# Patient Record
Sex: FEMALE | Race: WHITE | Employment: UNEMPLOYED | URBAN - METROPOLITAN AREA
[De-identification: names, ages, dates, MRNs, and addresses within clinical notes are randomized per-mention and may not be internally consistent; named-entity substitution may affect disease eponyms.]

---

## 2017-05-13 ENCOUNTER — HOSPITAL ENCOUNTER (EMERGENCY)
Facility: HOSPITAL | Age: 52
Discharge: HOME | End: 2017-05-13
Attending: EMERGENCY MEDICINE

## 2017-05-13 ENCOUNTER — APPOINTMENT (OUTPATIENT)
Dept: CT IMAGING | Facility: HOSPITAL | Age: 52
End: 2017-05-13
Attending: EMERGENCY MEDICINE

## 2017-05-13 ENCOUNTER — HOSPITAL ENCOUNTER (OUTPATIENT)
Age: 52
Discharge: ANOTHER HOSPITAL | End: 2017-05-13
Attending: FAMILY MEDICINE

## 2017-05-13 VITALS
HEIGHT: 67 IN | OXYGEN SATURATION: 100 % | TEMPERATURE: 98 F | BODY MASS INDEX: 23.07 KG/M2 | SYSTOLIC BLOOD PRESSURE: 116 MMHG | HEART RATE: 72 BPM | DIASTOLIC BLOOD PRESSURE: 84 MMHG | RESPIRATION RATE: 16 BRPM | WEIGHT: 147 LBS

## 2017-05-13 VITALS
WEIGHT: 145 LBS | HEIGHT: 67 IN | DIASTOLIC BLOOD PRESSURE: 72 MMHG | SYSTOLIC BLOOD PRESSURE: 102 MMHG | TEMPERATURE: 98 F | HEART RATE: 62 BPM | RESPIRATION RATE: 16 BRPM | OXYGEN SATURATION: 99 % | BODY MASS INDEX: 22.76 KG/M2

## 2017-05-13 DIAGNOSIS — R13.10 DYSPHAGIA, UNSPECIFIED TYPE: ICD-10-CM

## 2017-05-13 DIAGNOSIS — M54.2 NECK PAIN: Primary | ICD-10-CM

## 2017-05-13 DIAGNOSIS — J02.9 ACUTE PHARYNGITIS, UNSPECIFIED ETIOLOGY: ICD-10-CM

## 2017-05-13 DIAGNOSIS — R25.2 TRISMUS: Primary | ICD-10-CM

## 2017-05-13 DIAGNOSIS — I88.9 LYMPHADENITIS: ICD-10-CM

## 2017-05-13 PROCEDURE — 99285 EMERGENCY DEPT VISIT HI MDM: CPT

## 2017-05-13 PROCEDURE — 99205 OFFICE O/P NEW HI 60 MIN: CPT

## 2017-05-13 PROCEDURE — 80053 COMPREHEN METABOLIC PANEL: CPT | Performed by: EMERGENCY MEDICINE

## 2017-05-13 PROCEDURE — 70491 CT SOFT TISSUE NECK W/DYE: CPT | Performed by: EMERGENCY MEDICINE

## 2017-05-13 PROCEDURE — 96376 TX/PRO/DX INJ SAME DRUG ADON: CPT

## 2017-05-13 PROCEDURE — 96374 THER/PROPH/DIAG INJ IV PUSH: CPT

## 2017-05-13 PROCEDURE — 96375 TX/PRO/DX INJ NEW DRUG ADDON: CPT

## 2017-05-13 PROCEDURE — 85025 COMPLETE CBC W/AUTO DIFF WBC: CPT | Performed by: EMERGENCY MEDICINE

## 2017-05-13 RX ORDER — TRAMADOL HYDROCHLORIDE 50 MG/1
TABLET ORAL EVERY 4 HOURS PRN
Qty: 20 TABLET | Refills: 0 | Status: SHIPPED | OUTPATIENT
Start: 2017-05-13 | End: 2017-05-20

## 2017-05-13 RX ORDER — AMOXICILLIN AND CLAVULANATE POTASSIUM 875; 125 MG/1; MG/1
1 TABLET, FILM COATED ORAL 2 TIMES DAILY
Qty: 20 TABLET | Refills: 0 | Status: SHIPPED | OUTPATIENT
Start: 2017-05-13 | End: 2017-05-23

## 2017-05-13 RX ORDER — HYDROMORPHONE HYDROCHLORIDE 1 MG/ML
0.5 INJECTION, SOLUTION INTRAMUSCULAR; INTRAVENOUS; SUBCUTANEOUS ONCE
Status: COMPLETED | OUTPATIENT
Start: 2017-05-13 | End: 2017-05-13

## 2017-05-13 RX ORDER — ONDANSETRON 2 MG/ML
4 INJECTION INTRAMUSCULAR; INTRAVENOUS ONCE
Status: COMPLETED | OUTPATIENT
Start: 2017-05-13 | End: 2017-05-13

## 2017-05-13 RX ORDER — ONDANSETRON 4 MG/1
4 TABLET, ORALLY DISINTEGRATING ORAL EVERY 4 HOURS PRN
Qty: 10 TABLET | Refills: 0 | Status: SHIPPED | OUTPATIENT
Start: 2017-05-13 | End: 2017-05-20

## 2017-05-13 NOTE — ED PROVIDER NOTES
She is a 55-year-old female presenting to the emergency department with painful swollen area inferior to the right ear. Patient was initially evaluated by my partner, please refer to his documentation for additional details.     Patient was signed over t

## 2017-05-13 NOTE — ED PROVIDER NOTES
Patient Seen in: [facility] Emergency Department    History   Patient presents with:  Sore Throat  Swallowing Problem (gastrointestinal)    Stated Complaint: sore throat    HPI    Patient is a 15-year-old woman presents from immediate care for evalu Current:/79 mmHg  Pulse 66  Temp(Src) 98.2 °F (36.8 °C) (Temporal)  Resp 16  Ht 170.2 cm (5' 7\")  Wt 65.772 kg  BMI 22.71 kg/m2  SpO2 100%        Physical Exam    General: Patient is resting comfortably in no acute distress  HEENT: Normal ceph pathology, symptomatic care and follow-up with your ENT  in the next 2-3 days. Return if unable swallow, any shortness of breath, any fevers.         Disposition and Plan     Clinical Impression:  Neck pain  (primary encounter diagnosis)  Lymphadenitis

## 2017-05-13 NOTE — ED NOTES
>Received patient ambulatory to room, alert x oriented x 3 breathing easy, not in any distress with gait steady accompanied by spouse. Pt woke up with right side neck bump this morning. 10/10 when swallowing saliva.  Pt does not open mouth fully when talki

## 2017-05-13 NOTE — ED PROVIDER NOTES
Patient Seen in: THE [facility] [place] [facility]    History   Patient presents with:  Swelling  Sore Throat    Stated Complaint: swollen/sore throat    HPI    Erick Wilson is a 25-year-old female presents for evaluation of severe sore throat and difficul 66.679 kg  BMI 23.02 kg/m2  SpO2 100%        Physical Exam   Constitutional: She is oriented to person, place, and time. She appears well-developed and well-nourished. She appears distressed.    Patient actively cringes every time she swallows her own saliv the HPI, and have examined the patient and agree with the physical exam findings and the assessment and plan as above. Patient is unable to open her mouth to be able to hydrate and nourish herself.  She also has a firm-hard swelling that is warm and tender

## 2017-05-13 NOTE — ED INITIAL ASSESSMENT (HPI)
Right side neck- swelling noted today when pt woke up  Sore throat- x 2 days. Denies fever. Pain on swallowing, denies cough or cold symptoms. Pt states pain 10/10 when she swallows her saliva, pt does not fully open mouth when talking.

## 2017-05-13 NOTE — ED NOTES
Pt is up for discharge, but doesn't have a ride for another hour,  Pt is groggy, speech is slurred. Will wait until  is here before discharge.

## 2017-11-21 ENCOUNTER — IMAGING SERVICES (OUTPATIENT)
Dept: OTHER | Age: 52
End: 2017-11-21

## 2017-11-21 ENCOUNTER — HOSPITAL (OUTPATIENT)
Dept: OTHER | Age: 52
End: 2017-11-21
Attending: FAMILY MEDICINE

## 2019-01-14 ENCOUNTER — HOSPITAL (OUTPATIENT)
Dept: OTHER | Age: 54
End: 2019-01-14

## 2020-01-20 ENCOUNTER — HOSPITAL (OUTPATIENT)
Dept: OTHER | Age: 55
End: 2020-01-20

## 2020-09-21 ENCOUNTER — TELEPHONE (OUTPATIENT)
Dept: INTERNAL MEDICINE CLINIC | Facility: CLINIC | Age: 55
End: 2020-09-21

## 2020-09-21 NOTE — TELEPHONE ENCOUNTER
Patient requesting new patient to establish with TO; ok per staff message sent to Epic/replied to request as  is current patient

## 2020-09-28 ENCOUNTER — OFFICE VISIT (OUTPATIENT)
Dept: INTERNAL MEDICINE CLINIC | Facility: CLINIC | Age: 55
End: 2020-09-28
Payer: COMMERCIAL

## 2020-09-28 VITALS
WEIGHT: 153 LBS | RESPIRATION RATE: 16 BRPM | DIASTOLIC BLOOD PRESSURE: 70 MMHG | SYSTOLIC BLOOD PRESSURE: 110 MMHG | BODY MASS INDEX: 24.01 KG/M2 | HEART RATE: 72 BPM | OXYGEN SATURATION: 98 % | HEIGHT: 67 IN

## 2020-09-28 DIAGNOSIS — Z12.31 VISIT FOR SCREENING MAMMOGRAM: ICD-10-CM

## 2020-09-28 DIAGNOSIS — R22.1 MASS OF PARAPHARYNGEAL SPACE: ICD-10-CM

## 2020-09-28 DIAGNOSIS — E89.0 POSTOPERATIVE HYPOTHYROIDISM: Primary | ICD-10-CM

## 2020-09-28 DIAGNOSIS — Z00.00 LABORATORY EXAM ORDERED AS PART OF ROUTINE GENERAL MEDICAL EXAMINATION: ICD-10-CM

## 2020-09-28 DIAGNOSIS — L98.9 SKIN LESION: ICD-10-CM

## 2020-09-28 PROBLEM — H90.5 CONGENITAL HEARING LOSS: Status: ACTIVE | Noted: 2020-09-28

## 2020-09-28 PROCEDURE — 3078F DIAST BP <80 MM HG: CPT | Performed by: FAMILY MEDICINE

## 2020-09-28 PROCEDURE — 3074F SYST BP LT 130 MM HG: CPT | Performed by: FAMILY MEDICINE

## 2020-09-28 PROCEDURE — 99204 OFFICE O/P NEW MOD 45 MIN: CPT | Performed by: FAMILY MEDICINE

## 2020-09-28 PROCEDURE — 3008F BODY MASS INDEX DOCD: CPT | Performed by: FAMILY MEDICINE

## 2020-09-28 RX ORDER — LEVOTHYROXINE SODIUM 0.03 MG/1
TABLET ORAL
Refills: 0 | COMMUNITY
Start: 2020-09-28 | End: 2020-10-15

## 2020-09-28 RX ORDER — THYROID, PORCINE 60 MG/1
TABLET ORAL
COMMUNITY
Start: 2019-03-13 | End: 2020-10-15

## 2020-09-28 RX ORDER — LIOTHYRONINE SODIUM 25 UG/1
TABLET ORAL
Refills: 0 | COMMUNITY
Start: 2020-09-28 | End: 2020-10-15

## 2020-09-28 NOTE — PROGRESS NOTES
Kailyn Jaffe is a 48year old female.   HPI:   Here to get established   Recently     Daniel Samaniego sees us  Had Csope 3 yr ago   To get me records  May have had a piolyp        Also had EGD at the time     Was on PPI   No longer though kg/m²   GENERAL: well developed, well nourished,in no apparent distress  SKIN: no rashes  NECK: supple,no adenopathy   Healed scar    LUNGS: clear to auscultation  CARDIO: RRR without murmur  ABD:  Soft   NT  No mass or HSM    EXTREMITIES: no edema    ASSE

## 2020-10-08 ENCOUNTER — LAB ENCOUNTER (OUTPATIENT)
Dept: LAB | Age: 55
End: 2020-10-08
Attending: FAMILY MEDICINE
Payer: COMMERCIAL

## 2020-10-08 DIAGNOSIS — Z00.00 LABORATORY EXAM ORDERED AS PART OF ROUTINE GENERAL MEDICAL EXAMINATION: ICD-10-CM

## 2020-10-08 DIAGNOSIS — E89.0 POSTOPERATIVE HYPOTHYROIDISM: ICD-10-CM

## 2020-10-08 PROCEDURE — 80053 COMPREHEN METABOLIC PANEL: CPT

## 2020-10-08 PROCEDURE — 85025 COMPLETE CBC W/AUTO DIFF WBC: CPT

## 2020-10-08 PROCEDURE — 84439 ASSAY OF FREE THYROXINE: CPT

## 2020-10-08 PROCEDURE — 36415 COLL VENOUS BLD VENIPUNCTURE: CPT

## 2020-10-08 PROCEDURE — 80061 LIPID PANEL: CPT

## 2020-10-08 PROCEDURE — 84443 ASSAY THYROID STIM HORMONE: CPT

## 2020-10-12 PROBLEM — Z13.71 BRCA NEGATIVE: Status: ACTIVE | Noted: 2020-10-12

## 2020-10-12 PROBLEM — N95.2 ATROPHIC VAGINITIS: Status: ACTIVE | Noted: 2020-10-12

## 2020-10-12 PROBLEM — Z85.850 HX OF THYROID CANCER: Status: ACTIVE | Noted: 2020-10-12

## 2020-10-12 PROBLEM — Z86.010 HX OF COLONIC POLYPS: Status: ACTIVE | Noted: 2020-10-12

## 2020-10-12 PROCEDURE — 87624 HPV HI-RISK TYP POOLED RSLT: CPT | Performed by: OBSTETRICS & GYNECOLOGY

## 2020-10-12 PROCEDURE — 88175 CYTOPATH C/V AUTO FLUID REDO: CPT | Performed by: OBSTETRICS & GYNECOLOGY

## 2020-10-14 ENCOUNTER — PATIENT MESSAGE (OUTPATIENT)
Dept: INTERNAL MEDICINE CLINIC | Facility: CLINIC | Age: 55
End: 2020-10-14

## 2020-10-15 NOTE — TELEPHONE ENCOUNTER
From: Maria Del Carmen Light  To: Kei Gerard MD  Sent: 10/14/2020 2:37 PM CDT  Subject: Prescription Question    Hi Dr. Gricelda Reyna! S/P thyroid panel wnl (my TSH always kept hyperthyroid d/t hx thyroid carcinoma), I need refills on all three thyroid rx's pls.  H

## 2020-10-17 RX ORDER — THYROID, PORCINE 60 MG/1
TABLET ORAL
Qty: 90 TABLET | Refills: 0 | Status: SHIPPED | OUTPATIENT
Start: 2020-10-17 | End: 2020-11-15

## 2020-10-17 RX ORDER — LIOTHYRONINE SODIUM 25 UG/1
25 TABLET ORAL DAILY
Qty: 90 TABLET | Refills: 0 | Status: SHIPPED | OUTPATIENT
Start: 2020-10-17 | End: 2020-10-21

## 2020-10-17 RX ORDER — LEVOTHYROXINE SODIUM 0.03 MG/1
25 TABLET ORAL
Qty: 90 TABLET | Refills: 0 | Status: SHIPPED | OUTPATIENT
Start: 2020-10-17 | End: 2021-02-02

## 2020-10-17 NOTE — TELEPHONE ENCOUNTER
Failed protocol - TSH value between 0.350 and 5.500 IU/ml    Requesting:  Levothyroxine Sodium 25 MCG Oral Tab  Liothyronine Sodium 25 MCG Oral Tab  Thyroid 65 MG Oral Tab    LOV: 9/28/20  RTC: NA  Last Relevant Labs: 10/8/20  Filled: Historical entry on 9

## 2020-10-20 ENCOUNTER — PATIENT MESSAGE (OUTPATIENT)
Dept: INTERNAL MEDICINE CLINIC | Facility: CLINIC | Age: 55
End: 2020-10-20

## 2020-10-21 NOTE — TELEPHONE ENCOUNTER
From: Maria Del Carmen Light  To: Kei Gerard MD  Sent: 10/20/2020 5:38 PM CDT  Subject: Visit Fultonjane Reyna! Attached are the reports of my prior diagnostics.

## 2020-10-21 NOTE — TELEPHONE ENCOUNTER
From: Franco Light  To:  Karma Gore MD  Sent: 10/20/2020 5:44 PM CDT  Subject: Visit Follow-up Question    Diagnostic Reports

## 2020-10-21 NOTE — TELEPHONE ENCOUNTER
From: Thor Allen  To:  Cheikh Ling MD  Sent: 10/20/2020 5:40 PM CDT  Subject: Visit Follow-up Question    Diagnostic Reports

## 2020-10-21 NOTE — TELEPHONE ENCOUNTER
From: Ferny Light  To:  Brenna Quiroz MD  Sent: 10/20/2020 5:41 PM CDT  Subject: Visit Follow-up Question    Diagnostic Repoirts

## 2020-10-30 ENCOUNTER — HOSPITAL ENCOUNTER (OUTPATIENT)
Dept: MAMMOGRAPHY | Age: 55
Discharge: HOME | End: 2020-10-30
Attending: FAMILY MEDICINE
Payer: COMMERCIAL

## 2020-10-30 DIAGNOSIS — Z12.31 VISIT FOR SCREENING MAMMOGRAM: ICD-10-CM

## 2020-10-30 PROCEDURE — 77063 BREAST TOMOSYNTHESIS BI: CPT | Performed by: FAMILY MEDICINE

## 2020-10-30 PROCEDURE — 77067 SCR MAMMO BI INCL CAD: CPT | Performed by: FAMILY MEDICINE

## 2020-11-04 ENCOUNTER — HOSPITAL ENCOUNTER (OUTPATIENT)
Dept: MAMMOGRAPHY | Facility: HOSPITAL | Age: 55
Discharge: HOME | End: 2020-11-04
Attending: FAMILY MEDICINE
Payer: COMMERCIAL

## 2020-11-04 DIAGNOSIS — R92.2 INCONCLUSIVE MAMMOGRAM: ICD-10-CM

## 2020-11-04 DIAGNOSIS — R92.8 ABNORMAL MAMMOGRAM OF LEFT BREAST: Primary | ICD-10-CM

## 2020-11-04 PROCEDURE — 76642 ULTRASOUND BREAST LIMITED: CPT | Performed by: FAMILY MEDICINE

## 2020-11-04 PROCEDURE — 77061 BREAST TOMOSYNTHESIS UNI: CPT | Performed by: FAMILY MEDICINE

## 2020-11-04 PROCEDURE — 77065 DX MAMMO INCL CAD UNI: CPT | Performed by: FAMILY MEDICINE

## 2020-11-04 NOTE — IMAGING NOTE
Assisted Dr Gerson Carpio with Left breast biopsy recommendation, BIRADS 4C. Explained procedure and written instructions given. Pt screened and denies blood thinners, bleeding issues, chemo or liver disease.  Reviewed to eat, take 1000 mg of acetaminophen, and br

## 2020-11-06 ENCOUNTER — HOSPITAL ENCOUNTER (OUTPATIENT)
Dept: MAMMOGRAPHY | Facility: HOSPITAL | Age: 55
Discharge: HOME | End: 2020-11-06
Attending: FAMILY MEDICINE
Payer: COMMERCIAL

## 2020-11-06 ENCOUNTER — TELEPHONE (OUTPATIENT)
Dept: INTERNAL MEDICINE CLINIC | Facility: CLINIC | Age: 55
End: 2020-11-06

## 2020-11-06 DIAGNOSIS — R92.8 ABNORMAL MAMMOGRAM OF LEFT BREAST: ICD-10-CM

## 2020-11-06 PROCEDURE — 88377 M/PHMTRC ALYS ISHQUANT/SEMIQ: CPT | Performed by: FAMILY MEDICINE

## 2020-11-06 PROCEDURE — 19083 BX BREAST 1ST LESION US IMAG: CPT | Performed by: FAMILY MEDICINE

## 2020-11-06 PROCEDURE — 88342 IMHCHEM/IMCYTCHM 1ST ANTB: CPT | Performed by: FAMILY MEDICINE

## 2020-11-06 PROCEDURE — 77065 DX MAMMO INCL CAD UNI: CPT | Performed by: FAMILY MEDICINE

## 2020-11-06 PROCEDURE — 88360 TUMOR IMMUNOHISTOCHEM/MANUAL: CPT | Performed by: FAMILY MEDICINE

## 2020-11-06 PROCEDURE — 88305 TISSUE EXAM BY PATHOLOGIST: CPT | Performed by: FAMILY MEDICINE

## 2020-11-10 ENCOUNTER — NURSE NAVIGATOR ENCOUNTER (OUTPATIENT)
Dept: HEMATOLOGY/ONCOLOGY | Facility: HOSPITAL | Age: 55
End: 2020-11-10

## 2020-11-10 ENCOUNTER — TELEPHONE (OUTPATIENT)
Dept: INTERNAL MEDICINE CLINIC | Facility: CLINIC | Age: 55
End: 2020-11-10

## 2020-11-10 ENCOUNTER — TELEPHONE (OUTPATIENT)
Dept: MAMMOGRAPHY | Facility: HOSPITAL | Age: 55
End: 2020-11-10

## 2020-11-10 DIAGNOSIS — C50.912 MALIGNANT NEOPLASM OF LEFT FEMALE BREAST (HCC): Primary | ICD-10-CM

## 2020-11-10 NOTE — TELEPHONE ENCOUNTER
Spoke with Devang Freeman, nurse from Dr. Alves Blind office and results of breast biopsy given. Pt has IDC. Dr. Kenny Meier or Dr. Michelle Bound to be consulted.

## 2020-11-10 NOTE — TELEPHONE ENCOUNTER
Received call from Desiree Norton at [facility] department, stating she will call patient with following results and get her set up with their Navigator to schedule with Dr. Kris Fishman or Dr. Uche Allen. Patient will call our office if any further questions. CRISI to TO.        Keisha Sullivan

## 2020-11-10 NOTE — PROGRESS NOTES
Patient phoned  and discussed newly diagnosed breast cancer. Introduced myself and explained my role as the breast navigator.  Explained the role of the physicians on her breast cancer care team. Reviewed over pathology report and discussed the type of patrick

## 2020-11-10 NOTE — IMAGING NOTE
Pt reports no unexpected issues at biopsy site. Pt provided with results of pathology report of diagnosis of IDC. Surgical consult Dr. Yosi Villavicencio or Dr. Shelagh Bernheim recommended by PCP office.  Pt aware that Cesario Linares breast care navigator will be contacting her and c

## 2020-11-11 DIAGNOSIS — C50.912 MALIGNANT NEOPLASM OF LEFT FEMALE BREAST, UNSPECIFIED ESTROGEN RECEPTOR STATUS, UNSPECIFIED SITE OF BREAST (HCC): Primary | ICD-10-CM

## 2020-11-12 ENCOUNTER — HOSPITAL ENCOUNTER (OUTPATIENT)
Dept: MRI IMAGING | Facility: HOSPITAL | Age: 55
Discharge: HOME | End: 2020-11-12
Attending: SURGERY
Payer: COMMERCIAL

## 2020-11-12 DIAGNOSIS — C50.912 MALIGNANT NEOPLASM OF LEFT FEMALE BREAST (HCC): ICD-10-CM

## 2020-11-12 PROCEDURE — A9575 INJ GADOTERATE MEGLUMI 0.1ML: HCPCS | Performed by: SURGERY

## 2020-11-12 PROCEDURE — 77049 MRI BREAST C-+ W/CAD BI: CPT | Performed by: SURGERY

## 2020-11-14 ENCOUNTER — PATIENT MESSAGE (OUTPATIENT)
Dept: INTERNAL MEDICINE CLINIC | Facility: CLINIC | Age: 55
End: 2020-11-14

## 2020-11-15 NOTE — TELEPHONE ENCOUNTER
From: Lupis Light  To: Cherylyn Sandhoff, MD  Sent: 11/14/2020 11:43 AM CST  Subject: Prescription Question    Hi Dr. Amara Reynoso! Can I please have a refill on my NP Thyroid 1 grain (60mg)? There was a prior dosing error so it is not on my rx listing.  I

## 2020-11-16 RX ORDER — LEVOTHYROXINE AND LIOTHYRONINE 38; 9 UG/1; UG/1
60 TABLET ORAL DAILY
Qty: 90 TABLET | Refills: 0 | Status: SHIPPED | OUTPATIENT
Start: 2020-11-16 | End: 2021-02-02

## 2020-11-17 ENCOUNTER — OFFICE VISIT (OUTPATIENT)
Dept: SURGERY | Facility: CLINIC | Age: 55
End: 2020-11-17

## 2020-11-17 ENCOUNTER — GENETICS ENCOUNTER (OUTPATIENT)
Dept: GENETICS | Facility: HOSPITAL | Age: 55
End: 2020-11-17
Attending: INTERNAL MEDICINE
Payer: COMMERCIAL

## 2020-11-17 ENCOUNTER — NURSE ONLY (OUTPATIENT)
Dept: HEMATOLOGY/ONCOLOGY | Facility: HOSPITAL | Age: 55
End: 2020-11-17
Attending: INTERNAL MEDICINE
Payer: COMMERCIAL

## 2020-11-17 ENCOUNTER — HOSPITAL ENCOUNTER (OUTPATIENT)
Dept: GENERAL RADIOLOGY | Facility: HOSPITAL | Age: 55
Discharge: HOME | End: 2020-11-17
Attending: INTERNAL MEDICINE
Payer: COMMERCIAL

## 2020-11-17 ENCOUNTER — NURSE NAVIGATOR ENCOUNTER (OUTPATIENT)
Dept: HEMATOLOGY/ONCOLOGY | Facility: HOSPITAL | Age: 55
End: 2020-11-17

## 2020-11-17 VITALS
OXYGEN SATURATION: 99 % | HEIGHT: 67.09 IN | TEMPERATURE: 98 F | DIASTOLIC BLOOD PRESSURE: 73 MMHG | RESPIRATION RATE: 18 BRPM | SYSTOLIC BLOOD PRESSURE: 136 MMHG | HEART RATE: 69 BPM | BODY MASS INDEX: 23.7 KG/M2 | WEIGHT: 151 LBS

## 2020-11-17 VITALS
HEART RATE: 69 BPM | HEIGHT: 67.09 IN | TEMPERATURE: 98 F | RESPIRATION RATE: 18 BRPM | OXYGEN SATURATION: 99 % | DIASTOLIC BLOOD PRESSURE: 73 MMHG | WEIGHT: 151 LBS | BODY MASS INDEX: 23.7 KG/M2 | SYSTOLIC BLOOD PRESSURE: 136 MMHG

## 2020-11-17 DIAGNOSIS — Z17.0 MALIGNANT NEOPLASM OF UPPER-OUTER QUADRANT OF LEFT BREAST IN FEMALE, ESTROGEN RECEPTOR POSITIVE (HCC): Primary | ICD-10-CM

## 2020-11-17 DIAGNOSIS — C50.412 MALIGNANT NEOPLASM OF UPPER-OUTER QUADRANT OF LEFT BREAST IN FEMALE, ESTROGEN RECEPTOR POSITIVE (HCC): Primary | ICD-10-CM

## 2020-11-17 DIAGNOSIS — C50.412 MALIGNANT NEOPLASM OF UPPER-OUTER QUADRANT OF LEFT BREAST IN FEMALE, ESTROGEN RECEPTOR POSITIVE (HCC): ICD-10-CM

## 2020-11-17 DIAGNOSIS — Z17.0 MALIGNANT NEOPLASM OF UPPER-OUTER QUADRANT OF LEFT BREAST IN FEMALE, ESTROGEN RECEPTOR POSITIVE (HCC): ICD-10-CM

## 2020-11-17 PROCEDURE — 96040 HC GENETIC COUNSELING EA 30 MIN: CPT | Performed by: GENETIC COUNSELOR, MS

## 2020-11-17 PROCEDURE — 3078F DIAST BP <80 MM HG: CPT | Performed by: SURGERY

## 2020-11-17 PROCEDURE — 99245 OFF/OP CONSLTJ NEW/EST HI 55: CPT | Performed by: SURGERY

## 2020-11-17 PROCEDURE — 3075F SYST BP GE 130 - 139MM HG: CPT | Performed by: SURGERY

## 2020-11-17 PROCEDURE — 3008F BODY MASS INDEX DOCD: CPT | Performed by: SURGERY

## 2020-11-17 PROCEDURE — 99245 OFF/OP CONSLTJ NEW/EST HI 55: CPT | Performed by: INTERNAL MEDICINE

## 2020-11-17 PROCEDURE — 71046 X-RAY EXAM CHEST 2 VIEWS: CPT | Performed by: INTERNAL MEDICINE

## 2020-11-17 PROCEDURE — 36415 COLL VENOUS BLD VENIPUNCTURE: CPT

## 2020-11-17 NOTE — CONSULTS
Cancer Center Report of Consultation    Patient Name: Kisha Reeves   YOB: 1965   Medical Record Number: [id number]   CSN: [id number]   Consulting Physician: Yovanny Tejeda MD  Referring Physician(s): No ref.  provider found  Date of Consult d/t papillary carcinoma    near complete thyroidectomy          Family Medical History:  Family History   Problem Relation Age of Onset   • Breast Cancer Mother 62   • Ovarian Cancer Mother 61   • Ashkenazi [employer] Descent Self        Gyne History:  OB Hist Special Diet: Not Asked        Stress Concern: Not Asked        Weight Concern: Not Asked    Social History Narrative      Not on file      Allergies:     Codeine                 PALPITATIONS  Ephedrine               PALPITATIONS    Current Medication calm and appropriate for this visit. The pertinent positives and negatives were described in the HPI and above. All other systems were negative.       Vital Signs:  Height: 170.4 cm (5' 7.09\") (11/17 5929)  Weight: 68.5 kg (151 lb) (11/17 0908)  BSA (Ca cardiac silhouette. MEDIASTINUM:  Normal.  PLEURA:  Normal.  No pleural effusions. BONES:  Normal for age.    =====  CONCLUSION:  There is no evidence of active cardiopulmonary disease.     MRI Breast on 11/12/2020:  FINDINGS:  Left breast:  Generalized l ULTRASOUND-GUIDED BIOPSY: LEFT BREAST      Assessment/Plan:    Left breast cancer in the upper outer breast:  Grade II IDC  %  FL 8%  Ki-67 18%  Her2 IHC 2+; FISH not amplified    She has a very small lesion on US and MRI.  She is a candidate for br

## 2020-11-17 NOTE — PROGRESS NOTES
Patient is here today for Consult  with Dr. Maru Quinteros / Rema Wright. Patient Denies pain. Medication list and medical history were reviewed and updated.     Education Record    Learner:  Patient and spouse    Disease / Diagnosis: Rema Wright / Dr. Ana Novoa

## 2020-11-17 NOTE — PROGRESS NOTES
Referring Provider:  Ena Herbert MD    Additional Provider(s):  MD Odalys Chen MD    Reason for Referral:  Rebeca Pedraza was referred for genetic counseling because of a new diagnosis of breast cancer and a family history o the pedigree for additional family history information. Counseling: The following information was discussed with MsCristine Natasha Diandra and her . Genetics of Breast and Ovarian Cancer:   In the [place], approximately 1 in 8 women will develop br dealing with a hereditary cancer syndrome involving a different gene. It is also possible that Ms. Light’s relatives have a pathogenic variant in one of these genes that Ms. Light did not inherit.  In this scenario, options for cancer screening/management multigene panel is indicated. At the conclusion of the counseling session Ms. Light decided to proceed with genetic testing. Written consent was obtained. I will request O authorization for testing.   Blood and paperwork were sent to [facility]

## 2020-11-17 NOTE — PROGRESS NOTES
Met with patient and her , Dora Ayala in clinic. Introduced myself as the breast navigator nurse and explained my role and how I will work with all of the physicians involved in her care.  Explained the role of all of the physicians involved in her care in

## 2020-11-17 NOTE — PROGRESS NOTES
Breast Surgery New Patient Consultation    This is the first visit for this 48year old woman, referred by Dr. Yadiel Mcarthur, who presents for evaluation of breast cancer.     History of Present Illness:   Ms. Yaakov Agarwal is a 48year old woman who presents wi Diagnosis Date   • Cancer [facility])     thyroid cancer   • Endometriosis        Past Surgical History:   Procedure Laterality Date   • ABLATION     • THYROIDECTOMY  1983    d/t papillary carcinoma    near complete thyroidectomy          Gynecological History chest pain, chest pressure/discomfort, palpitations, irregular heartbeat, fainting or near-fainting, difficulty breathing when lying flat, SOB/Coughing at night, swelling of the legs or chest pain while walking.     Breasts:  See history of present illness age. Her speech patterns and movements are normal. Her affect is appropriate. HEENT: The head is normocephalic. The neck is supple. The thyroid is not enlarged and is without palpable masses/nodules. There are no palpable masses.  The trachea is in the m findings. Personally reviewed her recent imaging and pathology we discussed this at length.     The natural history and evolution of breast cancer were discussed with Ms. Cinyd Casey and her family, including the difference between in-situ and invasiv her next appointment.

## 2020-11-17 NOTE — PATIENT INSTRUCTIONS
Dr. Susan Truong  Tel: [phone]  Fax: [facility]  [place] ., [facility], [address]  [phone]       Surgery/Procedure: Left breast wire localized lumpectomy, left lymphoscintigraphy, left sentinel lymph node biopsy office listed above. 10. Do not take any blood thinners at least one week prior to the procedure/surgery. This includes aspirin, baby aspirin, Motrin, Ibuprofen, herbal supplements, diet medications, vitamin E, fish oil and green tea supplements.  Please c

## 2020-11-19 ENCOUNTER — TELEPHONE (OUTPATIENT)
Dept: SURGERY | Facility: CLINIC | Age: 55
End: 2020-11-19

## 2020-11-19 DIAGNOSIS — Z17.0 MALIGNANT NEOPLASM OF UPPER-OUTER QUADRANT OF LEFT BREAST IN FEMALE, ESTROGEN RECEPTOR POSITIVE (HCC): Primary | ICD-10-CM

## 2020-11-19 DIAGNOSIS — C50.412 MALIGNANT NEOPLASM OF UPPER-OUTER QUADRANT OF LEFT BREAST IN FEMALE, ESTROGEN RECEPTOR POSITIVE (HCC): Primary | ICD-10-CM

## 2020-11-19 NOTE — TELEPHONE ENCOUNTER
I called the patient to schedule her procedure. I offered 12/11 but patient requested 12/18/20 due to her 's work schedule. Scheduled 12/18/2020 at [facility].  Confirmed date and location

## 2020-12-01 ENCOUNTER — GENETICS ENCOUNTER (OUTPATIENT)
Dept: HEMATOLOGY/ONCOLOGY | Facility: HOSPITAL | Age: 55
End: 2020-12-01

## 2020-12-01 NOTE — PROGRESS NOTES
Referring Provider:                    Don Hudson MD     Additional Provider(s):              MD Killian Escoto MD    Reason for Referral:  [facility] Wilmar Vega had updated genetic testing performed on 11/17/2020 because of a ne insufficient to recommend risk-reducing mastectomy, decision should be based on family history.   · Potential increase in ovarian cancer risk, with insufficient evidence for recommendation of RRSO  · Consider pancreatic cancer screening beginning at age 48 pathogenic variants due to the increased (but not well defined) risk for a second breast cancer. Ms. Stiven Doyle informed me that she is comfortable proceeding with lumpectomy followed by increased breast surveillance and was hoping to avoid mastectomy.   She

## 2020-12-02 ENCOUNTER — TELEPHONE (OUTPATIENT)
Dept: INTERNAL MEDICINE CLINIC | Facility: CLINIC | Age: 55
End: 2020-12-02

## 2020-12-02 ENCOUNTER — NURSE NAVIGATOR ENCOUNTER (OUTPATIENT)
Dept: HEMATOLOGY/ONCOLOGY | Facility: HOSPITAL | Age: 55
End: 2020-12-02

## 2020-12-02 NOTE — PROGRESS NOTES
Spoke with patient regarding genetic testing and surgery plan. Pt was found to have SANTI variant. Pt is planning to proceed with lumpectomy as scheduled on 12/18. She does have some questions regarding radiation, and her history of thyroid cancer.  Assisted

## 2020-12-02 NOTE — TELEPHONE ENCOUNTER
Per Pam Arguelles from [facility] patient accounts need a retro-referral for procedure, US breast biopsy 1 site left, cpt code: 48917, dated 11/6/2020.   ---------------------------------------------------------------  Called P and insurance rep informed referral neede

## 2020-12-02 NOTE — TELEPHONE ENCOUNTER
Eulalia Saucedo from [facility] patient accounts called requesting referral to be placed for procedure that was completed on 11/6 for breast biopsy    Dx: 92.8  CPT 93900

## 2020-12-10 RX ORDER — GLUTATHIONE 100 %
2 POWDER (GRAM) MISCELLANEOUS 3 TIMES DAILY
COMMUNITY

## 2020-12-11 ENCOUNTER — APPOINTMENT (OUTPATIENT)
Dept: RADIATION ONCOLOGY | Facility: HOSPITAL | Age: 55
End: 2020-12-11
Attending: INTERNAL MEDICINE
Payer: COMMERCIAL

## 2020-12-11 ENCOUNTER — TELEPHONE (OUTPATIENT)
Dept: INTERNAL MEDICINE CLINIC | Facility: CLINIC | Age: 55
End: 2020-12-11

## 2020-12-11 NOTE — TELEPHONE ENCOUNTER
Spoke with patient stating for a few weeks now, rash on neck and lower back comes and goes, painful, burning, raised bumpy, itchy, not fluid filled, does not do well on Benadryl, has tried applying hydrocortisone cream on it no improvement, has not changed

## 2020-12-11 NOTE — TELEPHONE ENCOUNTER
Patient has a rash on her neck that comes and goes she \"doesn't think it is shingles but not sure. \" please call to triage further

## 2020-12-14 ENCOUNTER — OFFICE VISIT (OUTPATIENT)
Dept: INTERNAL MEDICINE CLINIC | Facility: CLINIC | Age: 55
End: 2020-12-14

## 2020-12-14 ENCOUNTER — TELEPHONE (OUTPATIENT)
Dept: GENERAL RADIOLOGY | Facility: HOSPITAL | Age: 55
End: 2020-12-14

## 2020-12-14 ENCOUNTER — NURSE NAVIGATOR ENCOUNTER (OUTPATIENT)
Dept: HEMATOLOGY/ONCOLOGY | Facility: HOSPITAL | Age: 55
End: 2020-12-14

## 2020-12-14 VITALS
SYSTOLIC BLOOD PRESSURE: 120 MMHG | HEART RATE: 78 BPM | RESPIRATION RATE: 16 BRPM | BODY MASS INDEX: 23.7 KG/M2 | DIASTOLIC BLOOD PRESSURE: 76 MMHG | WEIGHT: 151 LBS | HEIGHT: 67 IN

## 2020-12-14 DIAGNOSIS — B35.9 TINEA: Primary | ICD-10-CM

## 2020-12-14 PROCEDURE — 3008F BODY MASS INDEX DOCD: CPT | Performed by: FAMILY MEDICINE

## 2020-12-14 PROCEDURE — 3078F DIAST BP <80 MM HG: CPT | Performed by: FAMILY MEDICINE

## 2020-12-14 PROCEDURE — 99213 OFFICE O/P EST LOW 20 MIN: CPT | Performed by: FAMILY MEDICINE

## 2020-12-14 PROCEDURE — 3074F SYST BP LT 130 MM HG: CPT | Performed by: FAMILY MEDICINE

## 2020-12-14 RX ORDER — CLOTRIMAZOLE AND BETAMETHASONE DIPROPIONATE 10; .64 MG/G; MG/G
CREAM TOPICAL
Qty: 60 G | Refills: 0 | Status: SHIPPED | OUTPATIENT
Start: 2020-12-14

## 2020-12-14 NOTE — PROGRESS NOTES
Returned pt's phone call regarding that pt went to her neighbor's house to get her hair done and stated that her neighbor that did her hair and another individual she saw this weekend were both COVID-19 positive.       Instructed pt to self-isolate until blanton

## 2020-12-14 NOTE — TELEPHONE ENCOUNTER
1340: Spoke with Andrew Og regarding sentinel lymph node procedure and wire localization procedure. Procedures explained answered. Mrs. Danyell Jamil verbalized understanding.

## 2020-12-14 NOTE — PROGRESS NOTES
Daryl Ache is a 48year old female.   HPI:   For several yrs has had this rash  Itchy  Off on   Can be on elbows at times or the forearms    Had a patch on the R neck that is better now    Currently on the R lower back area     No unusual products use o the R lower back area about 2x2cm  No vessicle   No pustules    Looks fungal    ASSESSMENT AND PLAN:   Tinea;  lotrisone for 2 weeks ordered   Call w update   The other areas could have more eczema in nature   The patient indicates understanding of these

## 2020-12-16 ENCOUNTER — LAB ENCOUNTER (OUTPATIENT)
Dept: LAB | Facility: HOSPITAL | Age: 55
End: 2020-12-16
Attending: SURGERY
Payer: COMMERCIAL

## 2020-12-16 DIAGNOSIS — Z13.71 BRCA NEGATIVE: ICD-10-CM

## 2020-12-18 ENCOUNTER — HOSPITAL ENCOUNTER (OUTPATIENT)
Facility: HOSPITAL | Age: 55
Setting detail: HOSPITAL OUTPATIENT SURGERY
Discharge: HOME | End: 2020-12-18
Attending: SURGERY | Admitting: SURGERY
Payer: COMMERCIAL

## 2020-12-18 ENCOUNTER — ANESTHESIA (OUTPATIENT)
Dept: SURGERY | Facility: HOSPITAL | Age: 55
End: 2020-12-18
Payer: COMMERCIAL

## 2020-12-18 ENCOUNTER — APPOINTMENT (OUTPATIENT)
Dept: MAMMOGRAPHY | Facility: HOSPITAL | Age: 55
End: 2020-12-18
Attending: SURGERY
Payer: COMMERCIAL

## 2020-12-18 ENCOUNTER — ANESTHESIA EVENT (OUTPATIENT)
Dept: SURGERY | Facility: HOSPITAL | Age: 55
End: 2020-12-18
Payer: COMMERCIAL

## 2020-12-18 ENCOUNTER — HOSPITAL ENCOUNTER (OUTPATIENT)
Dept: NUCLEAR MEDICINE | Facility: HOSPITAL | Age: 55
Discharge: HOME | End: 2020-12-18
Attending: SURGERY | Admitting: SURGERY
Payer: COMMERCIAL

## 2020-12-18 VITALS
WEIGHT: 151.44 LBS | OXYGEN SATURATION: 100 % | HEIGHT: 67 IN | RESPIRATION RATE: 16 BRPM | DIASTOLIC BLOOD PRESSURE: 67 MMHG | HEART RATE: 70 BPM | BODY MASS INDEX: 23.77 KG/M2 | SYSTOLIC BLOOD PRESSURE: 110 MMHG | TEMPERATURE: 99 F

## 2020-12-18 DIAGNOSIS — Z17.0 MALIGNANT NEOPLASM OF UPPER-OUTER QUADRANT OF LEFT BREAST IN FEMALE, ESTROGEN RECEPTOR POSITIVE (HCC): ICD-10-CM

## 2020-12-18 DIAGNOSIS — C50.412 MALIGNANT NEOPLASM OF UPPER-OUTER QUADRANT OF LEFT BREAST IN FEMALE, ESTROGEN RECEPTOR POSITIVE (HCC): ICD-10-CM

## 2020-12-18 DIAGNOSIS — Z13.71 BRCA NEGATIVE: Primary | ICD-10-CM

## 2020-12-18 PROCEDURE — 07B60ZX EXCISION OF LEFT AXILLARY LYMPHATIC, OPEN APPROACH, DIAGNOSTIC: ICD-10-PCS | Performed by: SURGERY

## 2020-12-18 PROCEDURE — 0H0U0ZZ ALTERATION OF LEFT BREAST, OPEN APPROACH: ICD-10-PCS | Performed by: SURGERY

## 2020-12-18 PROCEDURE — 78195 LYMPH SYSTEM IMAGING: CPT | Performed by: SURGERY

## 2020-12-18 PROCEDURE — 88360 TUMOR IMMUNOHISTOCHEM/MANUAL: CPT | Performed by: SURGERY

## 2020-12-18 PROCEDURE — 19281 PERQ DEVICE BREAST 1ST IMAG: CPT | Performed by: SURGERY

## 2020-12-18 PROCEDURE — 88305 TISSUE EXAM BY PATHOLOGIST: CPT | Performed by: SURGERY

## 2020-12-18 PROCEDURE — S0028 INJECTION, FAMOTIDINE, 20 MG: HCPCS

## 2020-12-18 PROCEDURE — 88307 TISSUE EXAM BY PATHOLOGIST: CPT | Performed by: SURGERY

## 2020-12-18 PROCEDURE — 0HBU0ZZ EXCISION OF LEFT BREAST, OPEN APPROACH: ICD-10-PCS | Performed by: SURGERY

## 2020-12-18 PROCEDURE — 88344 IMHCHEM/IMCYTCHM EA MLT ANTB: CPT | Performed by: SURGERY

## 2020-12-18 PROCEDURE — 76098 X-RAY EXAM SURGICAL SPECIMEN: CPT | Performed by: SURGERY

## 2020-12-18 RX ORDER — HYDROCODONE BITARTRATE AND ACETAMINOPHEN 5; 325 MG/1; MG/1
1 TABLET ORAL AS NEEDED
Status: DISCONTINUED | OUTPATIENT
Start: 2020-12-18 | End: 2020-12-18

## 2020-12-18 RX ORDER — CEFAZOLIN SODIUM/WATER 2 G/20 ML
2 SYRINGE (ML) INTRAVENOUS ONCE
Status: COMPLETED | OUTPATIENT
Start: 2020-12-18 | End: 2020-12-18

## 2020-12-18 RX ORDER — MEPERIDINE HYDROCHLORIDE 25 MG/ML
12.5 INJECTION INTRAMUSCULAR; INTRAVENOUS; SUBCUTANEOUS AS NEEDED
Status: DISCONTINUED | OUTPATIENT
Start: 2020-12-18 | End: 2020-12-18

## 2020-12-18 RX ORDER — HYDROCODONE BITARTRATE AND ACETAMINOPHEN 5; 325 MG/1; MG/1
1 TABLET ORAL EVERY 6 HOURS PRN
Qty: 20 TABLET | Refills: 0 | Status: SHIPPED | OUTPATIENT
Start: 2020-12-18 | End: 2020-12-29 | Stop reason: ALTCHOICE

## 2020-12-18 RX ORDER — DIAZEPAM 5 MG/1
5 TABLET ORAL AS NEEDED
Status: DISCONTINUED | OUTPATIENT
Start: 2020-12-18 | End: 2020-12-18 | Stop reason: HOSPADM

## 2020-12-18 RX ORDER — FAMOTIDINE 10 MG/ML
INJECTION, SOLUTION INTRAVENOUS AS NEEDED
Status: DISCONTINUED | OUTPATIENT
Start: 2020-12-18 | End: 2020-12-18 | Stop reason: SURG

## 2020-12-18 RX ORDER — LIDOCAINE AND PRILOCAINE 25; 25 MG/G; MG/G
CREAM TOPICAL ONCE
Status: COMPLETED | OUTPATIENT
Start: 2020-12-18 | End: 2020-12-18

## 2020-12-18 RX ORDER — NALOXONE HYDROCHLORIDE 0.4 MG/ML
80 INJECTION, SOLUTION INTRAMUSCULAR; INTRAVENOUS; SUBCUTANEOUS AS NEEDED
Status: DISCONTINUED | OUTPATIENT
Start: 2020-12-18 | End: 2020-12-18

## 2020-12-18 RX ORDER — SODIUM CHLORIDE, SODIUM LACTATE, POTASSIUM CHLORIDE, CALCIUM CHLORIDE 600; 310; 30; 20 MG/100ML; MG/100ML; MG/100ML; MG/100ML
INJECTION, SOLUTION INTRAVENOUS CONTINUOUS
Status: DISCONTINUED | OUTPATIENT
Start: 2020-12-18 | End: 2020-12-18

## 2020-12-18 RX ORDER — LIDOCAINE HYDROCHLORIDE 10 MG/ML
INJECTION, SOLUTION EPIDURAL; INFILTRATION; INTRACAUDAL; PERINEURAL AS NEEDED
Status: DISCONTINUED | OUTPATIENT
Start: 2020-12-18 | End: 2020-12-18 | Stop reason: SURG

## 2020-12-18 RX ORDER — BUPIVACAINE HYDROCHLORIDE 5 MG/ML
INJECTION, SOLUTION EPIDURAL; INTRACAUDAL AS NEEDED
Status: DISCONTINUED | OUTPATIENT
Start: 2020-12-18 | End: 2020-12-18 | Stop reason: HOSPADM

## 2020-12-18 RX ORDER — HYDROCODONE BITARTRATE AND ACETAMINOPHEN 5; 325 MG/1; MG/1
2 TABLET ORAL AS NEEDED
Status: DISCONTINUED | OUTPATIENT
Start: 2020-12-18 | End: 2020-12-18

## 2020-12-18 RX ORDER — LIDOCAINE HYDROCHLORIDE AND EPINEPHRINE 10; 10 MG/ML; UG/ML
INJECTION, SOLUTION INFILTRATION; PERINEURAL AS NEEDED
Status: DISCONTINUED | OUTPATIENT
Start: 2020-12-18 | End: 2020-12-18 | Stop reason: HOSPADM

## 2020-12-18 RX ORDER — METOCLOPRAMIDE HYDROCHLORIDE 5 MG/ML
INJECTION INTRAMUSCULAR; INTRAVENOUS AS NEEDED
Status: DISCONTINUED | OUTPATIENT
Start: 2020-12-18 | End: 2020-12-18 | Stop reason: SURG

## 2020-12-18 RX ORDER — ACETAMINOPHEN 500 MG
1000 TABLET ORAL ONCE
Status: DISCONTINUED | OUTPATIENT
Start: 2020-12-18 | End: 2020-12-18

## 2020-12-18 RX ORDER — ONDANSETRON 2 MG/ML
4 INJECTION INTRAMUSCULAR; INTRAVENOUS AS NEEDED
Status: DISCONTINUED | OUTPATIENT
Start: 2020-12-18 | End: 2020-12-18

## 2020-12-18 RX ORDER — ACETAMINOPHEN 500 MG
1000 TABLET ORAL EVERY 6 HOURS PRN
COMMUNITY
End: 2020-12-29 | Stop reason: ALTCHOICE

## 2020-12-18 RX ORDER — ONDANSETRON 2 MG/ML
INJECTION INTRAMUSCULAR; INTRAVENOUS AS NEEDED
Status: DISCONTINUED | OUTPATIENT
Start: 2020-12-18 | End: 2020-12-18 | Stop reason: SURG

## 2020-12-18 RX ORDER — DEXAMETHASONE SODIUM PHOSPHATE 4 MG/ML
VIAL (ML) INJECTION AS NEEDED
Status: DISCONTINUED | OUTPATIENT
Start: 2020-12-18 | End: 2020-12-18 | Stop reason: SURG

## 2020-12-18 RX ADMIN — DEXAMETHASONE SODIUM PHOSPHATE 8 MG: 4 MG/ML VIAL (ML) INJECTION at 15:38:00

## 2020-12-18 RX ADMIN — LIDOCAINE HYDROCHLORIDE 50 MG: 10 INJECTION, SOLUTION EPIDURAL; INFILTRATION; INTRACAUDAL; PERINEURAL at 15:36:00

## 2020-12-18 RX ADMIN — METOCLOPRAMIDE HYDROCHLORIDE 10 MG: 5 INJECTION INTRAMUSCULAR; INTRAVENOUS at 15:38:00

## 2020-12-18 RX ADMIN — CEFAZOLIN SODIUM/WATER 2 G: 2 G/20 ML SYRINGE (ML) INTRAVENOUS at 15:38:00

## 2020-12-18 RX ADMIN — SODIUM CHLORIDE, SODIUM LACTATE, POTASSIUM CHLORIDE, CALCIUM CHLORIDE: 600; 310; 30; 20 INJECTION, SOLUTION INTRAVENOUS at 16:21:00

## 2020-12-18 RX ADMIN — ONDANSETRON 4 MG: 2 INJECTION INTRAMUSCULAR; INTRAVENOUS at 16:07:00

## 2020-12-18 RX ADMIN — FAMOTIDINE 20 MG: 10 INJECTION, SOLUTION INTRAVENOUS at 15:38:00

## 2020-12-18 NOTE — ANESTHESIA PROCEDURE NOTES
Airway  Date/Time: 12/18/2020 3:37 PM  Urgency: elective      General Information and Staff    Patient location during procedure: OR  Anesthesiologist: Viviane Mejia MD  Performed: anesthesiologist     Indications and Patient Condition  Indications for airwa

## 2020-12-18 NOTE — BRIEF OP NOTE
Pre-Operative Diagnosis: Malignant neoplasm of upper-outer quadrant of left breast in female, estrogen receptor [facility] [C50.412, Z17.0]     Post-Operative Diagnosis: Malignant neoplasm of upper-outer quadrant of left breast in female, estrogen rece

## 2020-12-18 NOTE — H&P
History of Present Illness:   Ms. Kailyn Jaffe is a 48year old woman who presents with imaging detected left breast cancer. The patient denies any palpable masses, nipple discharge, skin changes or axillary symptoms.   She does have a strong family hi THYROIDECTOMY        d/t papillary carcinoma    near complete thyroidectomy            Gynecological History:  Pt is a    She denies any cumulative breastfeeding history  She achieved menarche at age 16   She denies any history of hormone replaceme when lying flat, SOB/Coughing at night, swelling of the legs or chest pain while walking.     Breasts:  See history of present illness     Gastrointestinal:     There is no history of difficulty or pain with swallowing, reflux symptoms, vomiting, dark or b normocephalic. The neck is supple. The thyroid is not enlarged and is without palpable masses/nodules. There are no palpable masses. The trachea is in the midline. Conjunctiva are clear, non-icteric.     Chest: The chest expands symmetrically.  The lungs ar length.     The natural history and evolution of breast cancer were discussed with Ms. Joel Lakhani and her family, including the difference between in-situ and invasive carcinoma, and the distinction between local and systemic disease and local and sy quadrant of left breast in female, estrogen receptor positive ([facility] [C50.412, Z17.0]    The above referenced H&P was reviewed by Mahesh Goncalves MD on 12/18/2020, the patient was examined and no significant changes have occurred in the patient's conditio

## 2020-12-18 NOTE — ANESTHESIA POSTPROCEDURE EVALUATION
[address] [facility] Patient Status:  Hospital Outpatient Surgery   Age/Gender 48year old female MRN [id number]   Location [facility] Attending Eddie Magana MD   Hosp Day # 0 PCP Carly Bhatt MD       An

## 2020-12-18 NOTE — ANESTHESIA PREPROCEDURE EVALUATION
PRE-OP EVALUATION    Patient Name: Kailyn Jaffe    Pre-op Diagnosis: Malignant neoplasm of upper-outer quadrant of left breast in female, estrogen receptor positive [facility] [C50.412, Z17.0]    Procedure(s):  Left breast wire localized lumpectomy, left ly •  clotrimazole-betamethasone 1-0.05 % External Cream, Apply to affected areas BID for 2 weeks, Disp: 60 g, Rfl: 0    •  ALPRAZolam (XANAX) 1 MG Oral Tab, Take by mouth., Disp: , Rfl:         Allergies: Codeine and Ephedrine      Anesthesia Evaluation    P CREATSERUM 0.79 10/08/2020    GLU 88 10/08/2020    CA 9.7 10/08/2020            Airway      Mallampati: II  Mouth opening: >3 FB  TM distance: 4 - 6 cm  Neck ROM: full Cardiovascular             Dental  Comment: Molar crowns           Pulmonary

## 2020-12-18 NOTE — IMAGING NOTE
See notes from [facility] Dec 14. Pt exposed to Covid through neighbor and another individual. PPE worn, 2 masks, goggles, gloves. Pt did clear throat and laugh with a snort during needle localization.  Fabienne Syed helped pt seal the nose portion of mas

## 2020-12-18 NOTE — IMAGING NOTE
Pt arrived from [facility] in [facility]. Assisted Dr. Krystal Vega and mamms tech, Saqib Meyer, with left breast wire loc. Pt education provided. Questions answered and emotional support given. Wire secure with dressing.  Mask that pt was wearing noted to be slipping down below nos

## 2020-12-19 NOTE — OPERATIVE REPORT
[address]    PATIENT'S NAME: Ross Alford   ATTENDING PHYSICIAN: Mallorie Lezama. Kezia Coats M.D. OPERATING PHYSICIAN: Mallorie Lezama. Kezia Coats M.D.    PATIENT ACCOUNT#:   [de-identified]    LOCATION:  [address] [place] [address] 3 EDWP 10  MEDICAL RECORD #:   [id number] agreed to proceed. OPERATIVE TECHNIQUE:  Patient was brought to the imaging suite.   She underwent a wire localization of the area of concern in the left breast.  She also underwent injection of radioisotope, as well as lymphoscintigraphy for sentinel ly the skin. The wire was identified, brought into the field. Using sharp dissection and electrocautery, a segment of breast tissue surrounding the tip of the wire was excised.   It was oriented with a short stitch/single clip superiorly and a long stitch/do 02:15:02  [facility] [id number]  GGT/    cc: Nory Holley M.D. RIGOBERTO Greer M.D.

## 2020-12-22 ENCOUNTER — NURSE NAVIGATOR ENCOUNTER (OUTPATIENT)
Dept: HEMATOLOGY/ONCOLOGY | Facility: HOSPITAL | Age: 55
End: 2020-12-22

## 2020-12-22 NOTE — PROGRESS NOTES
Called pt back regarding her question for Dr. Josephine Li about when she can go back to her daily regimen of running. Instructed pt that I am currently waiting for an answer from Dr. Josephine Li about it and I will call her once she answers.     Pt stated she was gr

## 2020-12-24 ENCOUNTER — NURSE NAVIGATOR ENCOUNTER (OUTPATIENT)
Dept: HEMATOLOGY/ONCOLOGY | Facility: HOSPITAL | Age: 55
End: 2020-12-24

## 2020-12-24 NOTE — PROGRESS NOTES
Phoned pt regarding her question for Dr. Rose Brian regarding if pt could go back to her daily regimen of running. Instructed pt that Dr. Roxanna Gayl me and relayed the message for the patient to be able to run, as tolerated, by patient.  Pt thanked me and

## 2020-12-28 NOTE — PROGRESS NOTES
Breast Surgery Post-Operative Visit    Diagnosis: Left breast cancer status post left breast lumpectomy with left sentinel node biopsy on  12/18/20      Stage: [id number]    Disease Status:  Surgical treatment complete, medical and radiation oncology recomm no other suspicious findings bilaterally. She underwent left breast lumpectomy. She is here for postoperative visit. She reports her pain is under control. She denies erythema, warmth, drainage, or fevers.   She is here today for evaluation and recommendati has no children. She is currently unemployed. Review of Systems:  General:   The patient denies, fever, chills, night sweats, fatigue, generalized weakness, change in appetite or weight loss.     HEENT:     The patient denies eye irritation, cataracts, bone pain.     Neuropsychiatric:  There is no history of migraines or severe headaches, seizure/epilepsy, speech problems, coordination problems, trembling/tremors, fainting/black outs, dizziness, memory problems, loss of sensation/numbness, problems walkin There is a well healing incision with no signs of infection. The axillary tail shows a significant axillary seroma. Abdomen: The abdomen is soft, flat and non tender. The liver is not enlarged. There are no palpable masses. Lymph Nodes:   The suprac complications. Wound care instructions were reviewed. She will see me in 2 weeks for surveillance clinical exam.  She is reluctant to pursue radiation secondary to concern of side effects and I recommended she meet with them to discuss her concerns.   A

## 2020-12-29 ENCOUNTER — OFFICE VISIT (OUTPATIENT)
Dept: SURGERY | Facility: CLINIC | Age: 55
End: 2020-12-29

## 2020-12-29 ENCOUNTER — NURSE NAVIGATOR ENCOUNTER (OUTPATIENT)
Dept: HEMATOLOGY/ONCOLOGY | Facility: HOSPITAL | Age: 55
End: 2020-12-29

## 2020-12-29 VITALS
DIASTOLIC BLOOD PRESSURE: 70 MMHG | RESPIRATION RATE: 16 BRPM | HEART RATE: 72 BPM | SYSTOLIC BLOOD PRESSURE: 109 MMHG | OXYGEN SATURATION: 98 %

## 2020-12-29 DIAGNOSIS — Z17.0 MALIGNANT NEOPLASM OF UPPER-OUTER QUADRANT OF LEFT BREAST IN FEMALE, ESTROGEN RECEPTOR POSITIVE (HCC): Primary | ICD-10-CM

## 2020-12-29 DIAGNOSIS — C50.412 MALIGNANT NEOPLASM OF UPPER-OUTER QUADRANT OF LEFT BREAST IN FEMALE, ESTROGEN RECEPTOR POSITIVE (HCC): Primary | ICD-10-CM

## 2020-12-29 PROBLEM — C50.912 INVASIVE DUCTAL CARCINOMA OF LEFT BREAST IN FEMALE (HCC): Status: ACTIVE | Noted: 2020-12-29

## 2020-12-29 PROCEDURE — 3074F SYST BP LT 130 MM HG: CPT | Performed by: SURGERY

## 2020-12-29 PROCEDURE — 3078F DIAST BP <80 MM HG: CPT | Performed by: SURGERY

## 2020-12-29 PROCEDURE — 99024 POSTOP FOLLOW-UP VISIT: CPT | Performed by: SURGERY

## 2020-12-29 NOTE — PROGRESS NOTES
Oncotype DX test ordered online per Dr. Gilmer Correa. Will assist in scheduling follow up appointment for once results are back.

## 2020-12-29 NOTE — PROGRESS NOTES
Met with patient following appointment with Dr. Lorena Bethea. Pt is going to follow up with Dr. Sandee Temple to discuss oncotype results. She is also going to meet with radiation oncology to discuss treatment recommendations.  Pt will phone with any other questions or co

## 2020-12-31 ENCOUNTER — MED REC SCAN ONLY (OUTPATIENT)
Dept: INTERNAL MEDICINE CLINIC | Facility: CLINIC | Age: 55
End: 2020-12-31

## 2020-12-31 ENCOUNTER — TELEPHONE (OUTPATIENT)
Dept: SURGERY | Facility: CLINIC | Age: 55
End: 2020-12-31

## 2020-12-31 NOTE — TELEPHONE ENCOUNTER
Returned pt phone call regarding her seroma  Patient reports that the seroma has filled up but has not gotten bigger and does not feel like it is about to burst  Informed pt that per Dr. Nelson Held \" It is not an emergency and she can wait until next week. \"

## 2021-01-04 ENCOUNTER — NURSE NAVIGATOR ENCOUNTER (OUTPATIENT)
Dept: HEMATOLOGY/ONCOLOGY | Facility: HOSPITAL | Age: 56
End: 2021-01-04

## 2021-01-04 NOTE — PROGRESS NOTES
Called [facility] for status regarding approval or refusal for 's [employer] Oncotype Dx. Per representative, status is pending and was referred to call back tomorrow. I will call [facility] back tomorrow for status again.

## 2021-01-08 ENCOUNTER — OFFICE VISIT (OUTPATIENT)
Dept: SURGERY | Facility: CLINIC | Age: 56
End: 2021-01-08

## 2021-01-08 VITALS
RESPIRATION RATE: 16 BRPM | OXYGEN SATURATION: 98 % | HEART RATE: 76 BPM | SYSTOLIC BLOOD PRESSURE: 145 MMHG | DIASTOLIC BLOOD PRESSURE: 82 MMHG

## 2021-01-08 DIAGNOSIS — M79.89 LEFT AXILLARY SWELLING: ICD-10-CM

## 2021-01-08 DIAGNOSIS — C50.412 MALIGNANT NEOPLASM OF UPPER-OUTER QUADRANT OF LEFT BREAST IN FEMALE, ESTROGEN RECEPTOR POSITIVE (HCC): Primary | ICD-10-CM

## 2021-01-08 DIAGNOSIS — Z17.0 MALIGNANT NEOPLASM OF UPPER-OUTER QUADRANT OF LEFT BREAST IN FEMALE, ESTROGEN RECEPTOR POSITIVE (HCC): Primary | ICD-10-CM

## 2021-01-08 PROCEDURE — 3077F SYST BP >= 140 MM HG: CPT | Performed by: SURGERY

## 2021-01-08 PROCEDURE — 99024 POSTOP FOLLOW-UP VISIT: CPT | Performed by: SURGERY

## 2021-01-08 PROCEDURE — 3079F DIAST BP 80-89 MM HG: CPT | Performed by: SURGERY

## 2021-01-11 ENCOUNTER — TELEPHONE (OUTPATIENT)
Dept: PHYSICAL THERAPY | Facility: HOSPITAL | Age: 56
End: 2021-01-11

## 2021-01-11 ENCOUNTER — ORDER TRANSCRIPTION (OUTPATIENT)
Dept: PHYSICAL THERAPY | Facility: HOSPITAL | Age: 56
End: 2021-01-11

## 2021-01-11 DIAGNOSIS — C50.412 MALIGNANT NEOPLASM OF UPPER-OUTER QUADRANT OF LEFT BREAST IN FEMALE, ESTROGEN RECEPTOR POSITIVE (HCC): ICD-10-CM

## 2021-01-11 DIAGNOSIS — M79.89 LEFT AXILLARY SWELLING: Primary | ICD-10-CM

## 2021-01-11 DIAGNOSIS — Z17.0 MALIGNANT NEOPLASM OF UPPER-OUTER QUADRANT OF LEFT BREAST IN FEMALE, ESTROGEN RECEPTOR POSITIVE (HCC): ICD-10-CM

## 2021-01-11 NOTE — PROGRESS NOTES
Breast Surgery Surveillance Visit    Diagnosis: Left breast cancer status post left breast lumpectomy with left sentinel node biopsy on  12/18/20      Stage: [id number]    Disease Status:  Surgical treatment complete, medical and radiation oncology recommen other suspicious findings bilaterally. She underwent left breast lumpectomy. She is here for postoperative visit. She reports her pain is under control. She denies erythema, warmth, drainage, or fevers.   She did have an aspiration of her axillary seroma at Smoker 1.00 Packs/day For 20.00 Years   Quit date: 2005   Smokeless tobacco: Never Used   The patient is . She has no children. She is currently unemployed.     Review of Systems:  General:   The patient denies, fever, chills, night sweats, fatigue nodes.     Musculoskeletal:  The patient denies muscle aches/pain, joint pain, stiff joints, neck pain, back pain or bone pain.     Neuropsychiatric:  There is no history of migraines or severe headaches, seizure/epilepsy, speech problems, coordination prob pectoralis. There is no nipple retraction. No nipple discharge can be elicited. The parenchyma is mildly nodular. There is a well healing incision with no signs of infection. The axillary tail shows a small residual axillary seroma. Abdomen:   The abdom instilled into the skin and subcutaneous tissues. 6cc of serous fluid was aspirated with a 22-gauge needle without complication. Resolution of the seroma was noted. A sterile dressing was applied. She tolerated this with no immediate complications.

## 2021-01-11 NOTE — TELEPHONE ENCOUNTER
Called pt to schedule lymphedema therapy. Dr Deadra Barthel nurse had reached out to this PT to get pt scheduled at [facility] for treatment- must have 1 treatment this week before Friday. Left message to pt to call back to schedule.    (Please schedule 90

## 2021-01-12 ENCOUNTER — TELEPHONE (OUTPATIENT)
Dept: SURGERY | Facility: CLINIC | Age: 56
End: 2021-01-12

## 2021-01-12 ENCOUNTER — TELEPHONE (OUTPATIENT)
Dept: PHYSICAL THERAPY | Facility: HOSPITAL | Age: 56
End: 2021-01-12

## 2021-01-12 NOTE — TELEPHONE ENCOUNTER
Attempted to call pt back regarding rescheduling her appt to earlier in the day. Pt has appt with Dr. Omi Barber in [place] at 1  Looking to reschedule to 10am same day  No answer. LVM after verifying verbal release.   Provided office number to call back or w

## 2021-01-13 ENCOUNTER — TELEPHONE (OUTPATIENT)
Dept: PHYSICAL THERAPY | Facility: HOSPITAL | Age: 56
End: 2021-01-13

## 2021-01-13 ENCOUNTER — APPOINTMENT (OUTPATIENT)
Dept: PHYSICAL THERAPY | Facility: HOSPITAL | Age: 56
End: 2021-01-13
Attending: SURGERY
Payer: COMMERCIAL

## 2021-01-13 ENCOUNTER — NURSE NAVIGATOR ENCOUNTER (OUTPATIENT)
Dept: HEMATOLOGY/ONCOLOGY | Facility: HOSPITAL | Age: 56
End: 2021-01-13

## 2021-01-13 DIAGNOSIS — Z17.0 MALIGNANT NEOPLASM OF UPPER-OUTER QUADRANT OF LEFT BREAST IN FEMALE, ESTROGEN RECEPTOR POSITIVE (HCC): ICD-10-CM

## 2021-01-13 DIAGNOSIS — C50.412 MALIGNANT NEOPLASM OF UPPER-OUTER QUADRANT OF LEFT BREAST IN FEMALE, ESTROGEN RECEPTOR POSITIVE (HCC): ICD-10-CM

## 2021-01-13 DIAGNOSIS — M79.89 LEFT AXILLARY SWELLING: Primary | ICD-10-CM

## 2021-01-13 NOTE — PROGRESS NOTES
Spoke with patient regarding plan of care. Pt is having some issues with cording and is trying to get in to PT. She was originally scheduled in [place], but this is too far for her to drive. Will assist with getting appointment closer to home.  Pt was supp

## 2021-01-14 ENCOUNTER — OFFICE VISIT (OUTPATIENT)
Dept: OCCUPATIONAL MEDICINE | Facility: HOSPITAL | Age: 56
End: 2021-01-14
Attending: SURGERY
Payer: COMMERCIAL

## 2021-01-14 ENCOUNTER — APPOINTMENT (OUTPATIENT)
Dept: HEMATOLOGY/ONCOLOGY | Facility: HOSPITAL | Age: 56
End: 2021-01-14
Attending: INTERNAL MEDICINE
Payer: COMMERCIAL

## 2021-01-14 DIAGNOSIS — C50.412 MALIGNANT NEOPLASM OF UPPER-OUTER QUADRANT OF LEFT BREAST IN FEMALE, ESTROGEN RECEPTOR POSITIVE (HCC): ICD-10-CM

## 2021-01-14 DIAGNOSIS — Z17.0 MALIGNANT NEOPLASM OF UPPER-OUTER QUADRANT OF LEFT BREAST IN FEMALE, ESTROGEN RECEPTOR POSITIVE (HCC): ICD-10-CM

## 2021-01-14 DIAGNOSIS — M79.89 LEFT AXILLARY SWELLING: ICD-10-CM

## 2021-01-14 PROCEDURE — 97535 SELF CARE MNGMENT TRAINING: CPT

## 2021-01-14 PROCEDURE — 97140 MANUAL THERAPY 1/> REGIONS: CPT

## 2021-01-14 PROCEDURE — 97167 OT EVAL HIGH COMPLEX 60 MIN: CPT

## 2021-01-14 NOTE — PROGRESS NOTES
UE LYMPHEDEMA EVALUATION:   Referring Physician: Dr. Josephine Li  Diagnosis: Left axillary swelling (M79.89) s/p lumpectomy with SLND for  Malignant neoplasm of upper-outer quadrant of left breast in female, estrogen receptor positive ([facility] [address] (C50.412,Z17.0) D impairment    ASSESSMENT   Jacquelin Murphy presents to Occupational Therapy evaluation with abnormal cord-like structures in her Left axilla/upper arm with pattern typical of abnormal lymphatic vessel changes following LND.  She also presents with excess Left upper AROM/Strength:  WNL bilateral UEs except Left shoulder flex= 147 degrees; abd= 165. Right shoulder flex and abd= 160 degrees.       Today’s Treatment and Response:   Lakesha Parra was advised to contact her surgeon re: rashing over surgical scars and to also will be independent in tissue tension release and home exercises. 2. Pt will tolerate Left UE light compression via Tensogrip sleeve for 7-10 hours. 3. Pt will tolerate therapeutic level of soft tissue mobilization of Left UE abnormal cording.    4. Pt 1/14/2021  To:4/14/2021              UE Circumferential Measurements (cm)   LEFT   RIGHT   Axilla 29.4 29.0   15cm above elbow crease 29.0 29.0   10cm above elbow crease 28.1 27.2   5cm above elbow crease 26.8 26.0   Elbow crease 24.8 24.5          20cm ab

## 2021-01-15 ENCOUNTER — OFFICE VISIT (OUTPATIENT)
Dept: HEMATOLOGY/ONCOLOGY | Age: 56
End: 2021-01-15
Attending: INTERNAL MEDICINE
Payer: COMMERCIAL

## 2021-01-15 ENCOUNTER — OFFICE VISIT (OUTPATIENT)
Dept: SURGERY | Facility: CLINIC | Age: 56
End: 2021-01-15

## 2021-01-15 VITALS
HEIGHT: 67.09 IN | WEIGHT: 150 LBS | TEMPERATURE: 98 F | SYSTOLIC BLOOD PRESSURE: 122 MMHG | DIASTOLIC BLOOD PRESSURE: 83 MMHG | BODY MASS INDEX: 23.54 KG/M2 | HEART RATE: 69 BPM | RESPIRATION RATE: 18 BRPM | OXYGEN SATURATION: 98 %

## 2021-01-15 VITALS
HEART RATE: 69 BPM | SYSTOLIC BLOOD PRESSURE: 122 MMHG | RESPIRATION RATE: 16 BRPM | TEMPERATURE: 98 F | DIASTOLIC BLOOD PRESSURE: 83 MMHG

## 2021-01-15 DIAGNOSIS — C50.912 INVASIVE DUCTAL CARCINOMA OF LEFT BREAST IN FEMALE (HCC): Primary | ICD-10-CM

## 2021-01-15 DIAGNOSIS — N64.89 SEROMA OF BREAST: Primary | ICD-10-CM

## 2021-01-15 DIAGNOSIS — R21 RASH: ICD-10-CM

## 2021-01-15 DIAGNOSIS — Z17.0 MALIGNANT NEOPLASM OF UPPER-OUTER QUADRANT OF LEFT BREAST IN FEMALE, ESTROGEN RECEPTOR POSITIVE (HCC): Primary | ICD-10-CM

## 2021-01-15 DIAGNOSIS — C50.412 MALIGNANT NEOPLASM OF UPPER-OUTER QUADRANT OF LEFT BREAST IN FEMALE, ESTROGEN RECEPTOR POSITIVE (HCC): Primary | ICD-10-CM

## 2021-01-15 PROCEDURE — 3074F SYST BP LT 130 MM HG: CPT | Performed by: PHYSICIAN ASSISTANT

## 2021-01-15 PROCEDURE — 99215 OFFICE O/P EST HI 40 MIN: CPT | Performed by: INTERNAL MEDICINE

## 2021-01-15 PROCEDURE — 99024 POSTOP FOLLOW-UP VISIT: CPT | Performed by: PHYSICIAN ASSISTANT

## 2021-01-15 PROCEDURE — 3079F DIAST BP 80-89 MM HG: CPT | Performed by: PHYSICIAN ASSISTANT

## 2021-01-15 RX ORDER — CEFADROXIL 500 MG/1
500 CAPSULE ORAL 2 TIMES DAILY
Qty: 20 CAPSULE | Refills: 0 | Status: SHIPPED | OUTPATIENT
Start: 2021-01-15 | End: 2021-02-19

## 2021-01-15 RX ORDER — PROCHLORPERAZINE MALEATE 10 MG
10 TABLET ORAL EVERY 6 HOURS PRN
Qty: 30 TABLET | Refills: 3 | Status: SHIPPED | OUTPATIENT
Start: 2021-01-15 | End: 2021-05-07

## 2021-01-15 RX ORDER — ONDANSETRON HYDROCHLORIDE 8 MG/1
8 TABLET, FILM COATED ORAL EVERY 8 HOURS PRN
Qty: 30 TABLET | Refills: 3 | Status: SHIPPED | OUTPATIENT
Start: 2021-01-15 | End: 2021-05-07

## 2021-01-15 RX ORDER — FLUCONAZOLE 150 MG/1
150 TABLET ORAL ONCE
Qty: 1 TABLET | Refills: 0 | Status: SHIPPED | OUTPATIENT
Start: 2021-01-15 | End: 2021-01-15

## 2021-01-15 RX ORDER — DEXAMETHASONE 4 MG/1
8 TABLET ORAL 2 TIMES DAILY WITH MEALS
Qty: 24 TABLET | Refills: 4 | Status: SHIPPED | OUTPATIENT
Start: 2021-01-15 | End: 2021-04-29 | Stop reason: ALTCHOICE

## 2021-01-15 NOTE — PROGRESS NOTES
Breast Surgery Surveillance Visit    Diagnosis: Left breast cancer status post left breast lumpectomy with left sentinel node biopsy on  12/18/20      Stage: [id number]    Disease Status:  Surgical treatment complete, medical and radiation oncology recommen other suspicious findings bilaterally. She underwent left breast lumpectomy. She is here for postoperative visit. She reports her pain is under control. She denies  warmth, drainage, or fevers.   She did have an aspiration of her axillary seroma at her last Cream, Apply to affected areas BID for 2 weeks, Disp: 60 g, Rfl: 0    •  Glutathione Does not apply Powder, Take 2 Doses/Fill by mouth 3 (three) times daily.  Whey Protein drink , Disp: , Rfl:     •  thyroid (NP THYROID) 60 MG Oral Tab, Take 1 tablet (60 mg congestion, nose bleeds, snoring, pain in mouth/throat, hoarseness, change in voice, facial trauma.     Respiratory:  The patient denies chronic cough, +phlegm, hemoptysis, pleurisy/chest pain, pneumonia, asthma, wheezing, difficulty in breathing with exert There is no history of +poor/slow wound healing, weight loss/gain, fertility or hormone problems, cold intolerance, +thyroid disease. Allergic/Immunologic:  There is no history of hives, hay fever, angioedema or anaphylaxis.     /83 (BP Location There is a single erythematous papule on the superior portion of her chest. There is an erythematous papule on the lower right back. Extremities: The extremities are without deformity, cyanosis or edema.     Impression:   Ms. Arthur Natarajan is a 48 ye

## 2021-01-18 ENCOUNTER — NURSE NAVIGATOR ENCOUNTER (OUTPATIENT)
Dept: HEMATOLOGY/ONCOLOGY | Facility: HOSPITAL | Age: 56
End: 2021-01-18

## 2021-01-18 ENCOUNTER — APPOINTMENT (OUTPATIENT)
Dept: PHYSICAL THERAPY | Facility: HOSPITAL | Age: 56
End: 2021-01-18
Payer: COMMERCIAL

## 2021-01-18 ENCOUNTER — PATIENT MESSAGE (OUTPATIENT)
Dept: INTERNAL MEDICINE CLINIC | Facility: CLINIC | Age: 56
End: 2021-01-18

## 2021-01-18 DIAGNOSIS — Z12.11 SCREENING FOR COLON CANCER: ICD-10-CM

## 2021-01-18 DIAGNOSIS — K80.20 GALLSTONES: ICD-10-CM

## 2021-01-18 NOTE — PROGRESS NOTES
LMOVMTCB regarding how pt's feeling and how her care plan for treating her breast cancer was going for her at this time. Awaiting phone call back from pt.

## 2021-01-18 NOTE — PROGRESS NOTES
Cancer Center Progress Note    Problem List:      Patient Active Problem List:     Postoperative hypothyroidism     Mass of parapharyngeal space     Congenital hearing loss     Hx of thyroid cancer     Hx of colonic polyps     Atrophic vaginitis - Estrogen excision.  -Ductal carcinoma in situ (DCIS), nuclear grade 2, solid type. -DCIS measures 2 mm (0.2 cm) from nearest new inferior margin of excision.      F- Excision, left breast, superior margin:   -Benign breast tissue.       G- Excision, left breast, de not apply Powder, Take 2 Doses/Fill by mouth 3 (three) times daily. Whey Protein drink , Disp: , Rfl:     •  thyroid (NP THYROID) 60 MG Oral Tab, Take 1 tablet (60 mg total) by mouth daily. , Disp: 90 tablet, Rfl: 0    •  Liothyronine Sodium 25 MCG Oral Tab vascularity. CARDIAC:  Normal size cardiac silhouette. MEDIASTINUM:  Normal.  PLEURA:  Normal.  No pleural effusions. BONES:  Normal for age.    =====  CONCLUSION:  There is no evidence of active cardiopulmonary disease.      Assessment/Plan:     Left br

## 2021-01-18 NOTE — TELEPHONE ENCOUNTER
From: Pantera Light  To: Tiffanie Fowler MD  Sent: 1/18/2021 1:28 PM CST  Subject: Non-Urgent Medical Question    Hi! I would like to complete my FIT screening. How do I go about doing this? Thank you l!

## 2021-01-19 ENCOUNTER — TELEPHONE (OUTPATIENT)
Dept: PHYSICAL THERAPY | Facility: HOSPITAL | Age: 56
End: 2021-01-19

## 2021-01-19 ENCOUNTER — APPOINTMENT (OUTPATIENT)
Dept: OCCUPATIONAL MEDICINE | Facility: HOSPITAL | Age: 56
End: 2021-01-19
Attending: SURGERY
Payer: COMMERCIAL

## 2021-01-19 ENCOUNTER — TELEPHONE (OUTPATIENT)
Dept: INTERNAL MEDICINE CLINIC | Facility: CLINIC | Age: 56
End: 2021-01-19

## 2021-01-19 ENCOUNTER — TELEPHONE (OUTPATIENT)
Dept: HEMATOLOGY/ONCOLOGY | Facility: HOSPITAL | Age: 56
End: 2021-01-19

## 2021-01-19 DIAGNOSIS — C50.912 INVASIVE DUCTAL CARCINOMA OF LEFT BREAST IN FEMALE (HCC): Primary | ICD-10-CM

## 2021-01-19 NOTE — TELEPHONE ENCOUNTER
Patient would like to cancel her appointments for treatment. She states she would like to seek a second opinion.  Dr Magali Vargas informed

## 2021-01-19 NOTE — TELEPHONE ENCOUNTER
Gracie Suggs  [facility] Clinical Staff   Cc: KATHLE[facility] Referral Pool   Phone Number: [phone]             .Reason for the order/referral:ONCOLOGY/F/UP   PCP: Rachele Barrow   Refer to Provider [facility]   Specialty:ONCOLOGY   Patient Insurance: Payor:

## 2021-01-19 NOTE — TELEPHONE ENCOUNTER
Ok this is Lissette's Pt , we were sharing but sometimes the Pt gets a little scared to try someone else  Oh well

## 2021-01-20 ENCOUNTER — APPOINTMENT (OUTPATIENT)
Dept: PHYSICAL THERAPY | Facility: HOSPITAL | Age: 56
End: 2021-01-20
Payer: COMMERCIAL

## 2021-01-21 ENCOUNTER — APPOINTMENT (OUTPATIENT)
Dept: HEMATOLOGY/ONCOLOGY | Age: 56
End: 2021-01-21
Attending: INTERNAL MEDICINE
Payer: COMMERCIAL

## 2021-01-21 ENCOUNTER — APPOINTMENT (OUTPATIENT)
Dept: RADIATION ONCOLOGY | Facility: HOSPITAL | Age: 56
End: 2021-01-21
Attending: RADIOLOGY
Payer: COMMERCIAL

## 2021-01-22 ENCOUNTER — TELEPHONE (OUTPATIENT)
Dept: INTERNAL MEDICINE CLINIC | Facility: CLINIC | Age: 56
End: 2021-01-22

## 2021-01-22 ENCOUNTER — OFFICE VISIT (OUTPATIENT)
Dept: SURGERY | Facility: CLINIC | Age: 56
End: 2021-01-22

## 2021-01-22 ENCOUNTER — NURSE NAVIGATOR ENCOUNTER (OUTPATIENT)
Dept: HEMATOLOGY/ONCOLOGY | Facility: HOSPITAL | Age: 56
End: 2021-01-22

## 2021-01-22 VITALS
OXYGEN SATURATION: 99 % | HEART RATE: 85 BPM | DIASTOLIC BLOOD PRESSURE: 83 MMHG | RESPIRATION RATE: 16 BRPM | SYSTOLIC BLOOD PRESSURE: 121 MMHG

## 2021-01-22 DIAGNOSIS — C50.412 MALIGNANT NEOPLASM OF UPPER-OUTER QUADRANT OF LEFT BREAST IN FEMALE, ESTROGEN RECEPTOR POSITIVE (HCC): Primary | ICD-10-CM

## 2021-01-22 DIAGNOSIS — Z17.0 MALIGNANT NEOPLASM OF UPPER-OUTER QUADRANT OF LEFT BREAST IN FEMALE, ESTROGEN RECEPTOR POSITIVE (HCC): Primary | ICD-10-CM

## 2021-01-22 DIAGNOSIS — C50.912 INVASIVE DUCTAL CARCINOMA OF LEFT BREAST IN FEMALE (HCC): Primary | ICD-10-CM

## 2021-01-22 PROCEDURE — 3079F DIAST BP 80-89 MM HG: CPT | Performed by: SURGERY

## 2021-01-22 PROCEDURE — 99024 POSTOP FOLLOW-UP VISIT: CPT | Performed by: SURGERY

## 2021-01-22 PROCEDURE — 3074F SYST BP LT 130 MM HG: CPT | Performed by: SURGERY

## 2021-01-22 NOTE — TELEPHONE ENCOUNTER
Spoke with patient notified of [facility] recommendations, she will call [facility] to discuss and call our office back.

## 2021-01-22 NOTE — TELEPHONE ENCOUNTER
Spoke with [facility] associate Mell-stating unable to approve referral to Rigoberto Robertson indicates [facility] agrees with referring to [address] [place], [place] [address]betty Chne LM on VM for patient to notify of above.

## 2021-01-22 NOTE — TELEPHONE ENCOUNTER
Emily WANG [facility] Clinical Staff   Cc: KATHLEEN [facility] Referral Pool   Phone Number: [phone]             .Reason for the order/referral:ONCOLOGY/2ND OPINION   PCP: Karan Cooper   Refer to Provider [address]

## 2021-01-25 ENCOUNTER — TELEPHONE (OUTPATIENT)
Dept: INTERNAL MEDICINE CLINIC | Facility: CLINIC | Age: 56
End: 2021-01-25

## 2021-01-25 NOTE — TELEPHONE ENCOUNTER
Patient is requesting chiropractor referral. Christophe Wallace for your review and approval if appropriate. Patient indicates had a fall a while back and hit her back, she since then complains of Right side back pain. Please advise.

## 2021-01-25 NOTE — TELEPHONE ENCOUNTER
Spoke with patient stating Dr. Isacc Spears recommended this oncologist. Patient indicates Dr. Isacc Spears internship with this provider. Please advise.

## 2021-01-25 NOTE — TELEPHONE ENCOUNTER
Patient called stating she spoke with [facility].   She was informed by [facility] that they have NOT rcv'd a referral to [facility] as yet and that referral to [facility] would be APPROVED for Dr. Steven Kent, oncology

## 2021-01-25 NOTE — TELEPHONE ENCOUNTER
Bailey Watkins  [facility] Clinical Staff   Cc: [facility] Referral Pool   Phone Number: [phone]             .Reason for the order/referral:CHIROPRACTIC/CONSULT   PCP: Carmen Pavon   Refer to Provider (RECOMMENDATION/IN NETWORK   Specialty:CHIROPRACTIC

## 2021-01-25 NOTE — PROGRESS NOTES
Breast Surgery Surveillance Visit    Diagnosis: Left breast cancer status post left breast lumpectomy with left sentinel node biopsy on  12/18/20      Stage: [id number]    Disease Status:  Surgical treatment complete, medical and radiation oncology recommen other suspicious findings bilaterally. She underwent left breast lumpectomy. She is here for postoperative visit. She reports her pain is under control. She denies erythema, warmth, drainage, or fevers.   She did have an aspiration of her axillary seroma an For 20.00 Years   Quit date: 2005   Smokeless tobacco: Never Used   The patient is . She has no children. She is currently unemployed.     Review of Systems:  General:   The patient denies, fever, chills, night sweats, fatigue, generalized weakness Musculoskeletal:  The patient denies muscle aches/pain, joint pain, stiff joints, neck pain, back pain or bone pain.     Neuropsychiatric:  There is no history of migraines or severe headaches, seizure/epilepsy, speech problems, coordination problems, t pectoralis. There is no nipple retraction. No nipple discharge can be elicited. The parenchyma is mildly nodular. There is a well healing incision with no signs of infection. The axillary tail shows no residual axillary seroma. Abdomen:   The abdomen is any limitations or restrictions. She was given ample opportunity for questions and those questions were answered to her satisfaction. She was encouraged to contact the office with any questions or concerns prior to her next scheduled appointment.

## 2021-01-25 NOTE — TELEPHONE ENCOUNTER
Patient indicates was seen by chiro a long time ago, but not with MD, and she has not tried PT. FU OV? Please advise.

## 2021-01-26 ENCOUNTER — APPOINTMENT (OUTPATIENT)
Dept: OCCUPATIONAL MEDICINE | Facility: HOSPITAL | Age: 56
End: 2021-01-26
Attending: SURGERY
Payer: COMMERCIAL

## 2021-01-26 ENCOUNTER — APPOINTMENT (OUTPATIENT)
Dept: PHYSICAL THERAPY | Facility: HOSPITAL | Age: 56
End: 2021-01-26
Payer: COMMERCIAL

## 2021-01-26 ENCOUNTER — TELEPHONE (OUTPATIENT)
Dept: PHYSICAL THERAPY | Facility: HOSPITAL | Age: 56
End: 2021-01-26

## 2021-01-27 ENCOUNTER — APPOINTMENT (OUTPATIENT)
Dept: PHYSICAL THERAPY | Facility: HOSPITAL | Age: 56
End: 2021-01-27
Attending: SURGERY
Payer: COMMERCIAL

## 2021-01-28 ENCOUNTER — OFFICE VISIT (OUTPATIENT)
Dept: OCCUPATIONAL MEDICINE | Facility: HOSPITAL | Age: 56
End: 2021-01-28
Attending: SURGERY
Payer: COMMERCIAL

## 2021-01-28 PROCEDURE — 97165 OT EVAL LOW COMPLEX 30 MIN: CPT

## 2021-01-28 PROCEDURE — 97140 MANUAL THERAPY 1/> REGIONS: CPT

## 2021-01-28 NOTE — PROGRESS NOTES
Dx: Left axillary swelling (M79.89) s/p lumpectomy with SLND for  Malignant neoplasm of upper-outer quadrant of left breast in female, estrogen receptor positive ([facility] (C50.412,Z17.0)         Insurance (Authorized # of Visits): 2/12-16 (2/8 HMO approved) Assessment:   Pt demonstrating good progress towards reduction of Left upper arm/upper quadrant volume; has achieved projected volume loss in upper arm.  Also with good progress in improvement in tissue mobility in Left axilla/upper arm; cording resolved;

## 2021-01-29 ENCOUNTER — APPOINTMENT (OUTPATIENT)
Dept: PHYSICAL THERAPY | Facility: HOSPITAL | Age: 56
End: 2021-01-29
Payer: COMMERCIAL

## 2021-02-01 ENCOUNTER — APPOINTMENT (OUTPATIENT)
Dept: PHYSICAL THERAPY | Facility: HOSPITAL | Age: 56
End: 2021-02-01
Payer: COMMERCIAL

## 2021-02-01 ENCOUNTER — TELEPHONE (OUTPATIENT)
Dept: PHYSICAL THERAPY | Facility: HOSPITAL | Age: 56
End: 2021-02-01

## 2021-02-01 ENCOUNTER — OFFICE VISIT (OUTPATIENT)
Dept: OCCUPATIONAL MEDICINE | Facility: HOSPITAL | Age: 56
End: 2021-02-01
Attending: SURGERY
Payer: COMMERCIAL

## 2021-02-01 PROCEDURE — 97140 MANUAL THERAPY 1/> REGIONS: CPT

## 2021-02-01 NOTE — PROGRESS NOTES
Dx: Left axillary swelling (M79.89) s/p lumpectomy with SLND for  Malignant neoplasm of upper-outer quadrant of left breast in female, [facility] (C50.412,Z17.0)         Insurance (Authorized # of Visits): 3/12-16 (3/8 HMO approved) by 2.7cm since initial eval. Left upper arm/elbow are now 1.0cm smaller than dominant Right. *Left shoulder pain-free active flex improved by 15 degrees to 162.   TREATMENT:  *Volume re-measurement   *AROM re-measurement   *Gentle soft tissue mobilization changes. 7. Independent in use of compression garments, self-manual lymph drainage, decongestive exercises, home exercise program, lymphedema precautions for life-long self-management of lymphatic changes. Plan:   Continue current plan.  Begin instruc

## 2021-02-02 ENCOUNTER — TELEPHONE (OUTPATIENT)
Dept: PHYSICAL THERAPY | Facility: HOSPITAL | Age: 56
End: 2021-02-02

## 2021-02-03 ENCOUNTER — APPOINTMENT (OUTPATIENT)
Dept: OCCUPATIONAL MEDICINE | Facility: HOSPITAL | Age: 56
End: 2021-02-03
Attending: SURGERY
Payer: COMMERCIAL

## 2021-02-03 ENCOUNTER — APPOINTMENT (OUTPATIENT)
Dept: PHYSICAL THERAPY | Facility: HOSPITAL | Age: 56
End: 2021-02-03
Payer: COMMERCIAL

## 2021-02-03 RX ORDER — LEVOTHYROXINE AND LIOTHYRONINE 38; 9 UG/1; UG/1
60 TABLET ORAL DAILY
Qty: 90 TABLET | Refills: 0 | Status: SHIPPED | OUTPATIENT
Start: 2021-02-03 | End: 2021-05-05

## 2021-02-03 RX ORDER — LIOTHYRONINE SODIUM 25 UG/1
TABLET ORAL
Qty: 90 TABLET | Refills: 0 | Status: SHIPPED | OUTPATIENT
Start: 2021-02-03 | End: 2021-07-28

## 2021-02-03 RX ORDER — LEVOTHYROXINE SODIUM 0.03 MG/1
25 TABLET ORAL
Qty: 90 TABLET | Refills: 0 | Status: SHIPPED | OUTPATIENT
Start: 2021-02-03 | End: 2021-05-05

## 2021-02-03 NOTE — TELEPHONE ENCOUNTER
Protocol failed   Medication(s) to Refill:   Requested Prescriptions     Pending Prescriptions Disp Refills   • Levothyroxine Sodium 25 MCG Oral Tab 90 tablet 0     Sig: Take 1 tablet (25 mcg total) by mouth before breakfast.   • Liothyronine Sodium 25 MCG [address], [address]               2/25/2021  4:30 PM  [facility] hospital OT LYMPH TX [[id number]] 60 min [facility] Occupational Therapy Tawnya Matias OT    Patient Instructions:         Location Instructions:      Your appointment is scheduled on the [facility]

## 2021-02-05 ENCOUNTER — APPOINTMENT (OUTPATIENT)
Dept: PHYSICAL THERAPY | Facility: HOSPITAL | Age: 56
End: 2021-02-05
Payer: COMMERCIAL

## 2021-02-08 ENCOUNTER — APPOINTMENT (OUTPATIENT)
Dept: PHYSICAL THERAPY | Facility: HOSPITAL | Age: 56
End: 2021-02-08
Payer: COMMERCIAL

## 2021-02-09 ENCOUNTER — APPOINTMENT (OUTPATIENT)
Dept: OCCUPATIONAL MEDICINE | Facility: HOSPITAL | Age: 56
End: 2021-02-09
Attending: SURGERY
Payer: COMMERCIAL

## 2021-02-10 ENCOUNTER — APPOINTMENT (OUTPATIENT)
Dept: PHYSICAL THERAPY | Facility: HOSPITAL | Age: 56
End: 2021-02-10
Payer: COMMERCIAL

## 2021-02-12 ENCOUNTER — APPOINTMENT (OUTPATIENT)
Dept: PHYSICAL THERAPY | Facility: HOSPITAL | Age: 56
End: 2021-02-12
Payer: COMMERCIAL

## 2021-02-12 ENCOUNTER — OFFICE VISIT (OUTPATIENT)
Dept: HEMATOLOGY/ONCOLOGY | Age: 56
End: 2021-02-12
Attending: INTERNAL MEDICINE
Payer: COMMERCIAL

## 2021-02-12 VITALS
HEIGHT: 66.97 IN | SYSTOLIC BLOOD PRESSURE: 115 MMHG | TEMPERATURE: 98 F | WEIGHT: 151.38 LBS | HEART RATE: 66 BPM | OXYGEN SATURATION: 99 % | DIASTOLIC BLOOD PRESSURE: 76 MMHG | BODY MASS INDEX: 23.76 KG/M2 | RESPIRATION RATE: 18 BRPM

## 2021-02-12 DIAGNOSIS — C50.912 INVASIVE DUCTAL CARCINOMA OF LEFT BREAST IN FEMALE (HCC): Primary | ICD-10-CM

## 2021-02-12 DIAGNOSIS — Z71.9 ENCOUNTER FOR HEALTH EDUCATION: ICD-10-CM

## 2021-02-12 LAB
ALBUMIN SERPL-MCNC: 4 G/DL (ref 3.4–5)
ALBUMIN/GLOB SERPL: 1.1 {RATIO} (ref 1–2)
ALP LIVER SERPL-CCNC: 86 U/L
ALT SERPL-CCNC: 21 U/L
ANION GAP SERPL CALC-SCNC: 3 MMOL/L (ref 0–18)
AST SERPL-CCNC: 15 U/L (ref 15–37)
BASOPHILS # BLD AUTO: 0.01 X10(3) UL (ref 0–0.2)
BASOPHILS NFR BLD AUTO: 0.3 %
BILIRUB SERPL-MCNC: 0.5 MG/DL (ref 0.1–2)
BUN BLD-MCNC: 16 MG/DL (ref 7–18)
BUN/CREAT SERPL: 18.2 (ref 10–20)
CALCIUM BLD-MCNC: 9.5 MG/DL (ref 8.5–10.1)
CHLORIDE SERPL-SCNC: 105 MMOL/L (ref 98–112)
CO2 SERPL-SCNC: 30 MMOL/L (ref 21–32)
CREAT BLD-MCNC: 0.88 MG/DL
DEPRECATED RDW RBC AUTO: 38.5 FL (ref 35.1–46.3)
EOSINOPHIL # BLD AUTO: 0.03 X10(3) UL (ref 0–0.7)
EOSINOPHIL NFR BLD AUTO: 0.8 %
ERYTHROCYTE [DISTWIDTH] IN BLOOD BY AUTOMATED COUNT: 11.9 % (ref 11–15)
GLOBULIN PLAS-MCNC: 3.6 G/DL (ref 2.8–4.4)
GLUCOSE BLD-MCNC: 86 MG/DL (ref 70–99)
HCT VFR BLD AUTO: 39.3 %
HGB BLD-MCNC: 13 G/DL
IMM GRANULOCYTES # BLD AUTO: 0.01 X10(3) UL (ref 0–1)
IMM GRANULOCYTES NFR BLD: 0.3 %
LYMPHOCYTES # BLD AUTO: 1.67 X10(3) UL (ref 1–4)
LYMPHOCYTES NFR BLD AUTO: 47.3 %
M PROTEIN MFR SERPL ELPH: 7.6 G/DL (ref 6.4–8.2)
MCH RBC QN AUTO: 29.4 PG (ref 26–34)
MCHC RBC AUTO-ENTMCNC: 33.1 G/DL (ref 31–37)
MCV RBC AUTO: 88.9 FL
MONOCYTES # BLD AUTO: 0.26 X10(3) UL (ref 0.1–1)
MONOCYTES NFR BLD AUTO: 7.4 %
NEUTROPHILS # BLD AUTO: 1.55 X10 (3) UL (ref 1.5–7.7)
NEUTROPHILS # BLD AUTO: 1.55 X10(3) UL (ref 1.5–7.7)
NEUTROPHILS NFR BLD AUTO: 43.9 %
OSMOLALITY SERPL CALC.SUM OF ELEC: 286 MOSM/KG (ref 275–295)
PATIENT FASTING Y/N/NP: NO
PLATELET # BLD AUTO: 226 10(3)UL (ref 150–450)
POTASSIUM SERPL-SCNC: 3.9 MMOL/L (ref 3.5–5.1)
RBC # BLD AUTO: 4.42 X10(6)UL
SODIUM SERPL-SCNC: 138 MMOL/L (ref 136–145)
WBC # BLD AUTO: 3.5 X10(3) UL (ref 4–11)

## 2021-02-12 PROCEDURE — 96375 TX/PRO/DX INJ NEW DRUG ADDON: CPT

## 2021-02-12 PROCEDURE — 99215 OFFICE O/P EST HI 40 MIN: CPT | Performed by: CLINICAL NURSE SPECIALIST

## 2021-02-12 PROCEDURE — 99417 PROLNG OP E/M EACH 15 MIN: CPT | Performed by: CLINICAL NURSE SPECIALIST

## 2021-02-12 PROCEDURE — 96413 CHEMO IV INFUSION 1 HR: CPT

## 2021-02-12 PROCEDURE — 96417 CHEMO IV INFUS EACH ADDL SEQ: CPT

## 2021-02-12 RX ORDER — DICYCLOMINE HCL 20 MG
20 TABLET ORAL 4 TIMES DAILY PRN
Qty: 30 TABLET | Refills: 0 | Status: SHIPPED | OUTPATIENT
Start: 2021-02-12 | End: 2021-06-02

## 2021-02-12 NOTE — PROGRESS NOTES
Pt here for C1D1 of Taxotere/Cytoxan.   Arrives Ambulating independently, accompanied by Self           Patient reports possible pregnancy since last therapy cycle: No    Modifications in dose or schedule: No     Frequency of blood return and site check thr

## 2021-02-12 NOTE — PATIENT INSTRUCTIONS
Take 2 dexamethasone twice a day with food the day before, the day of and the day after chemotherapy.

## 2021-02-12 NOTE — PROGRESS NOTES
Chemotherapy Education    Learner:  Patient and Spouse    Barriers / Limitations:  None    Chemotherapy education goals:  · Learn the drug names  · Administration schedule  · Routes of administration  · Treatment setting    Drug names:  Docetaxel, Cyclopho sex drive:  Achieved    Possible menstrual irregularity and vaginal dryness:  Achieved    Notify MD/RN of any changes or problems:  Achieved    Comments:    Treatment Effects on Emotional Status:    Potential mood changes, depression, nervousness, difficul

## 2021-02-15 ENCOUNTER — APPOINTMENT (OUTPATIENT)
Dept: OCCUPATIONAL MEDICINE | Facility: HOSPITAL | Age: 56
End: 2021-02-15
Attending: SURGERY
Payer: COMMERCIAL

## 2021-02-18 ENCOUNTER — TELEPHONE (OUTPATIENT)
Dept: OCCUPATIONAL MEDICINE | Facility: HOSPITAL | Age: 56
End: 2021-02-18

## 2021-02-18 ENCOUNTER — APPOINTMENT (OUTPATIENT)
Dept: OCCUPATIONAL MEDICINE | Facility: HOSPITAL | Age: 56
End: 2021-02-18
Attending: SURGERY
Payer: COMMERCIAL

## 2021-02-19 ENCOUNTER — OFFICE VISIT (OUTPATIENT)
Dept: HEMATOLOGY/ONCOLOGY | Age: 56
End: 2021-02-19
Attending: INTERNAL MEDICINE
Payer: COMMERCIAL

## 2021-02-19 ENCOUNTER — OFFICE VISIT (OUTPATIENT)
Dept: OCCUPATIONAL MEDICINE | Facility: HOSPITAL | Age: 56
End: 2021-02-19
Attending: SURGERY
Payer: COMMERCIAL

## 2021-02-19 VITALS
RESPIRATION RATE: 18 BRPM | OXYGEN SATURATION: 97 % | TEMPERATURE: 98 F | DIASTOLIC BLOOD PRESSURE: 88 MMHG | SYSTOLIC BLOOD PRESSURE: 127 MMHG | HEART RATE: 77 BPM | BODY MASS INDEX: 24 KG/M2 | WEIGHT: 151 LBS

## 2021-02-19 DIAGNOSIS — C50.912 INVASIVE DUCTAL CARCINOMA OF LEFT BREAST IN FEMALE (HCC): Primary | ICD-10-CM

## 2021-02-19 PROCEDURE — 99214 OFFICE O/P EST MOD 30 MIN: CPT | Performed by: INTERNAL MEDICINE

## 2021-02-19 NOTE — PROGRESS NOTES
Cancer Center Progress Note    Problem List:      Patient Active Problem List:     Postoperative hypothyroidism     Mass of parapharyngeal space     Congenital hearing loss     Hx of thyroid cancer     Hx of colonic polyps     Atrophic vaginitis - Estrogen Excision, left breast, inferior margin:   -Invasive ductal carcinoma, grade 3, measuring 1 mm (0.1 cm) in maximal dimension.  -Invasive tumor measures 2 mm (0.2 cm) from nearest new inferior margin of excision.  -Ductal carcinoma in situ (DCIS), nuclear gr mcg total) by mouth before breakfast., Disp: 90 tablet, Rfl: 0    •  Liothyronine Sodium 25 MCG Oral Tab, Take 1/2 tablet by mouth daily. , Disp: 90 tablet, Rfl: 0    •  thyroid (NP THYROID) 60 MG Oral Tab, Take 1 tablet (60 mg total) by mouth daily. , Disp: HGB 13.0 02/12/2021    HCT 39.3 02/12/2021    MCV 88.9 02/12/2021    MCH 29.4 02/12/2021    MCHC 33.1 02/12/2021    RDW 11.9 02/12/2021    .0 02/12/2021     Lab Results   Component Value Date     02/12/2021    K 3.9 02/12/2021     02/12/

## 2021-02-19 NOTE — PROGRESS NOTES
Dx: Left axillary swelling (M79.89) s/p lumpectomy with SLND for  Malignant neoplasm of upper-outer quadrant of left breast in female, estrogen receptor [facility] (C50.412,Z17.0)         Insurance (Authorized # of Visits): 4/12-16 (3/8 HMO approved) by 2.7cm since initial eval. Left upper arm/elbow are now 1.0cm smaller than dominant Right. *Left shoulder pain-free active flex improved by 15 degrees to 162.   TREATMENT:  *Volume re-measurement   *AROM re-measurement   *Gentle soft tissue mobilization changes. 7. Independent in use of compression garments, self-manual lymph drainage, decongestive exercises, home exercise program, lymphedema precautions for life-long self-management of lymphatic changes. Plan:   Continue current plan.  Begin instruc

## 2021-02-23 ENCOUNTER — APPOINTMENT (OUTPATIENT)
Dept: OCCUPATIONAL MEDICINE | Facility: HOSPITAL | Age: 56
End: 2021-02-23
Attending: SURGERY
Payer: COMMERCIAL

## 2021-02-25 ENCOUNTER — APPOINTMENT (OUTPATIENT)
Dept: OCCUPATIONAL MEDICINE | Facility: HOSPITAL | Age: 56
End: 2021-02-25
Attending: SURGERY
Payer: COMMERCIAL

## 2021-03-02 ENCOUNTER — APPOINTMENT (OUTPATIENT)
Dept: OCCUPATIONAL MEDICINE | Facility: HOSPITAL | Age: 56
End: 2021-03-02
Attending: SURGERY
Payer: COMMERCIAL

## 2021-03-04 ENCOUNTER — APPOINTMENT (OUTPATIENT)
Dept: OCCUPATIONAL MEDICINE | Facility: HOSPITAL | Age: 56
End: 2021-03-04
Attending: SURGERY
Payer: COMMERCIAL

## 2021-03-05 ENCOUNTER — OFFICE VISIT (OUTPATIENT)
Dept: HEMATOLOGY/ONCOLOGY | Age: 56
End: 2021-03-05
Attending: INTERNAL MEDICINE
Payer: COMMERCIAL

## 2021-03-05 VITALS
HEIGHT: 66.97 IN | RESPIRATION RATE: 18 BRPM | TEMPERATURE: 98 F | BODY MASS INDEX: 23.86 KG/M2 | OXYGEN SATURATION: 99 % | WEIGHT: 152 LBS | HEART RATE: 67 BPM | SYSTOLIC BLOOD PRESSURE: 127 MMHG | DIASTOLIC BLOOD PRESSURE: 81 MMHG

## 2021-03-05 DIAGNOSIS — Z51.11 CHEMOTHERAPY MANAGEMENT, ENCOUNTER FOR: ICD-10-CM

## 2021-03-05 DIAGNOSIS — C50.912 INVASIVE DUCTAL CARCINOMA OF LEFT BREAST IN FEMALE (HCC): Primary | ICD-10-CM

## 2021-03-05 DIAGNOSIS — T45.1X5D CHEMOTHERAPY ADVERSE REACTION, SUBSEQUENT ENCOUNTER: ICD-10-CM

## 2021-03-05 LAB
ALBUMIN SERPL-MCNC: 3.8 G/DL (ref 3.4–5)
ALBUMIN/GLOB SERPL: 1.1 {RATIO} (ref 1–2)
ALP LIVER SERPL-CCNC: 82 U/L
ALT SERPL-CCNC: 28 U/L
ANION GAP SERPL CALC-SCNC: 5 MMOL/L (ref 0–18)
AST SERPL-CCNC: 15 U/L (ref 15–37)
BASOPHILS # BLD AUTO: 0.02 X10(3) UL (ref 0–0.2)
BASOPHILS NFR BLD AUTO: 0.2 %
BILIRUB SERPL-MCNC: 0.3 MG/DL (ref 0.1–2)
BUN BLD-MCNC: 14 MG/DL (ref 7–18)
BUN/CREAT SERPL: 15.9 (ref 10–20)
CALCIUM BLD-MCNC: 10.1 MG/DL (ref 8.5–10.1)
CHLORIDE SERPL-SCNC: 104 MMOL/L (ref 98–112)
CO2 SERPL-SCNC: 28 MMOL/L (ref 21–32)
CREAT BLD-MCNC: 0.88 MG/DL
DEPRECATED RDW RBC AUTO: 37.5 FL (ref 35.1–46.3)
EOSINOPHIL # BLD AUTO: 0 X10(3) UL (ref 0–0.7)
EOSINOPHIL NFR BLD AUTO: 0 %
ERYTHROCYTE [DISTWIDTH] IN BLOOD BY AUTOMATED COUNT: 11.9 % (ref 11–15)
GLOBULIN PLAS-MCNC: 3.6 G/DL (ref 2.8–4.4)
GLUCOSE BLD-MCNC: 114 MG/DL (ref 70–99)
HCT VFR BLD AUTO: 36.5 %
HGB BLD-MCNC: 12.3 G/DL
IMM GRANULOCYTES # BLD AUTO: 0.06 X10(3) UL (ref 0–1)
IMM GRANULOCYTES NFR BLD: 0.7 %
LYMPHOCYTES # BLD AUTO: 1.95 X10(3) UL (ref 1–4)
LYMPHOCYTES NFR BLD AUTO: 22.2 %
M PROTEIN MFR SERPL ELPH: 7.4 G/DL (ref 6.4–8.2)
MCH RBC QN AUTO: 29.1 PG (ref 26–34)
MCHC RBC AUTO-ENTMCNC: 33.7 G/DL (ref 31–37)
MCV RBC AUTO: 86.5 FL
MONOCYTES # BLD AUTO: 0.85 X10(3) UL (ref 0.1–1)
MONOCYTES NFR BLD AUTO: 9.7 %
NEUTROPHILS # BLD AUTO: 5.92 X10 (3) UL (ref 1.5–7.7)
NEUTROPHILS # BLD AUTO: 5.92 X10(3) UL (ref 1.5–7.7)
NEUTROPHILS NFR BLD AUTO: 67.2 %
OSMOLALITY SERPL CALC.SUM OF ELEC: 285 MOSM/KG (ref 275–295)
PATIENT FASTING Y/N/NP: NO
PLATELET # BLD AUTO: 359 10(3)UL (ref 150–450)
POTASSIUM SERPL-SCNC: 3.5 MMOL/L (ref 3.5–5.1)
RBC # BLD AUTO: 4.22 X10(6)UL
SODIUM SERPL-SCNC: 137 MMOL/L (ref 136–145)
WBC # BLD AUTO: 8.8 X10(3) UL (ref 4–11)

## 2021-03-05 PROCEDURE — 96375 TX/PRO/DX INJ NEW DRUG ADDON: CPT

## 2021-03-05 PROCEDURE — 96417 CHEMO IV INFUS EACH ADDL SEQ: CPT

## 2021-03-05 PROCEDURE — 96413 CHEMO IV INFUSION 1 HR: CPT

## 2021-03-05 PROCEDURE — 99215 OFFICE O/P EST HI 40 MIN: CPT | Performed by: INTERNAL MEDICINE

## 2021-03-05 NOTE — PROGRESS NOTES
Cancer Center Progress Note    Problem List:      Patient Active Problem List:     Postoperative hypothyroidism     Mass of parapharyngeal space     Congenital hearing loss     Hx of thyroid cancer     Hx of colonic polyps     Atrophic vaginitis - Estrogen carcinoma, grade 3, measuring 1 mm (0.1 cm) in maximal dimension.  -Invasive tumor measures 2 mm (0.2 cm) from nearest new inferior margin of excision.  -Ductal carcinoma in situ (DCIS), nuclear grade 2, solid type.   -DCIS measures 2 mm (0.2 cm) from neare 0    •  Liothyronine Sodium 25 MCG Oral Tab, Take 1/2 tablet by mouth daily. , Disp: 90 tablet, Rfl: 0    •  thyroid (NP THYROID) 60 MG Oral Tab, Take 1 tablet (60 mg total) by mouth daily. , Disp: 90 tablet, Rfl: 0    •  Prochlorperazine Maleate (COMPAZINE) tenderness. Psychiatric: The patient's mood is calm and appropriate for this visit.        Labs reviewed at this visit:     Lab Results   Component Value Date    WBC 8.8 03/05/2021    RBC 4.22 03/05/2021    HGB 12.3 03/05/2021    HCT 36.5 03/05/2021    MCV

## 2021-03-12 ENCOUNTER — HOSPITAL ENCOUNTER (EMERGENCY)
Facility: HOSPITAL | Age: 56
Discharge: HOME | End: 2021-03-13
Attending: EMERGENCY MEDICINE
Payer: COMMERCIAL

## 2021-03-12 ENCOUNTER — APPOINTMENT (OUTPATIENT)
Dept: CT IMAGING | Facility: HOSPITAL | Age: 56
End: 2021-03-12
Attending: EMERGENCY MEDICINE
Payer: COMMERCIAL

## 2021-03-12 ENCOUNTER — APPOINTMENT (OUTPATIENT)
Dept: ULTRASOUND IMAGING | Facility: HOSPITAL | Age: 56
End: 2021-03-12
Attending: EMERGENCY MEDICINE
Payer: COMMERCIAL

## 2021-03-12 VITALS
HEIGHT: 67 IN | SYSTOLIC BLOOD PRESSURE: 115 MMHG | BODY MASS INDEX: 23.23 KG/M2 | WEIGHT: 148 LBS | OXYGEN SATURATION: 99 % | DIASTOLIC BLOOD PRESSURE: 80 MMHG | RESPIRATION RATE: 18 BRPM | TEMPERATURE: 97 F | HEART RATE: 63 BPM

## 2021-03-12 DIAGNOSIS — K80.50 BILIARY COLIC: Primary | ICD-10-CM

## 2021-03-12 LAB
ALBUMIN SERPL-MCNC: 3.6 G/DL (ref 3.4–5)
ALBUMIN/GLOB SERPL: 1 {RATIO} (ref 1–2)
ALP LIVER SERPL-CCNC: 74 U/L
ALT SERPL-CCNC: 30 U/L
ANION GAP SERPL CALC-SCNC: 5 MMOL/L (ref 0–18)
AST SERPL-CCNC: 26 U/L (ref 15–37)
BASOPHILS # BLD AUTO: 0.02 X10(3) UL (ref 0–0.2)
BASOPHILS NFR BLD AUTO: 1 %
BILIRUB SERPL-MCNC: 0.5 MG/DL (ref 0.1–2)
BILIRUB UR QL STRIP.AUTO: NEGATIVE
BUN BLD-MCNC: 11 MG/DL (ref 7–18)
BUN/CREAT SERPL: 15.3 (ref 10–20)
CALCIUM BLD-MCNC: 9.1 MG/DL (ref 8.5–10.1)
CHLORIDE SERPL-SCNC: 108 MMOL/L (ref 98–112)
CLARITY UR REFRACT.AUTO: CLEAR
CO2 SERPL-SCNC: 26 MMOL/L (ref 21–32)
CREAT BLD-MCNC: 0.72 MG/DL
DEPRECATED RDW RBC AUTO: 34.9 FL (ref 35.1–46.3)
EOSINOPHIL # BLD AUTO: 0 X10(3) UL (ref 0–0.7)
EOSINOPHIL NFR BLD AUTO: 0 %
ERYTHROCYTE [DISTWIDTH] IN BLOOD BY AUTOMATED COUNT: 11.4 % (ref 11–15)
GLOBULIN PLAS-MCNC: 3.6 G/DL (ref 2.8–4.4)
GLUCOSE BLD-MCNC: 100 MG/DL (ref 70–99)
GLUCOSE UR STRIP.AUTO-MCNC: NEGATIVE MG/DL
HCT VFR BLD AUTO: 33.2 %
HGB BLD-MCNC: 11.7 G/DL
IMM GRANULOCYTES # BLD AUTO: 0.01 X10(3) UL (ref 0–1)
IMM GRANULOCYTES NFR BLD: 0.5 %
KETONES UR STRIP.AUTO-MCNC: NEGATIVE MG/DL
LEUKOCYTE ESTERASE UR QL STRIP.AUTO: NEGATIVE
LIPASE SERPL-CCNC: 155 U/L (ref 73–393)
LYMPHOCYTES # BLD AUTO: 1.5 X10(3) UL (ref 1–4)
LYMPHOCYTES NFR BLD AUTO: 74.6 %
M PROTEIN MFR SERPL ELPH: 7.2 G/DL (ref 6.4–8.2)
MCH RBC QN AUTO: 29.5 PG (ref 26–34)
MCHC RBC AUTO-ENTMCNC: 35.2 G/DL (ref 31–37)
MCV RBC AUTO: 83.6 FL
MONOCYTES # BLD AUTO: 0.08 X10(3) UL (ref 0.1–1)
MONOCYTES NFR BLD AUTO: 4 %
NEUTROPHILS # BLD AUTO: 0.4 X10 (3) UL (ref 1.5–7.7)
NEUTROPHILS # BLD AUTO: 0.4 X10(3) UL (ref 1.5–7.7)
NEUTROPHILS NFR BLD AUTO: 19.9 %
NITRITE UR QL STRIP.AUTO: NEGATIVE
OSMOLALITY SERPL CALC.SUM OF ELEC: 287 MOSM/KG (ref 275–295)
PH UR STRIP.AUTO: 5 [PH] (ref 5–8)
PLATELET # BLD AUTO: 224 10(3)UL (ref 150–450)
POTASSIUM SERPL-SCNC: 4.1 MMOL/L (ref 3.5–5.1)
PROT UR STRIP.AUTO-MCNC: NEGATIVE MG/DL
RBC # BLD AUTO: 3.97 X10(6)UL
RBC UR QL AUTO: NEGATIVE
SODIUM SERPL-SCNC: 139 MMOL/L (ref 136–145)
SP GR UR STRIP.AUTO: 1.01 (ref 1–1.03)
UROBILINOGEN UR STRIP.AUTO-MCNC: <2 MG/DL
WBC # BLD AUTO: 2 X10(3) UL (ref 4–11)

## 2021-03-12 PROCEDURE — 80053 COMPREHEN METABOLIC PANEL: CPT

## 2021-03-12 PROCEDURE — 93010 ELECTROCARDIOGRAM REPORT: CPT

## 2021-03-12 PROCEDURE — 93005 ELECTROCARDIOGRAM TRACING: CPT

## 2021-03-12 PROCEDURE — 85025 COMPLETE CBC W/AUTO DIFF WBC: CPT

## 2021-03-12 PROCEDURE — 99285 EMERGENCY DEPT VISIT HI MDM: CPT

## 2021-03-12 PROCEDURE — 76700 US EXAM ABDOM COMPLETE: CPT | Performed by: EMERGENCY MEDICINE

## 2021-03-12 PROCEDURE — 96360 HYDRATION IV INFUSION INIT: CPT

## 2021-03-12 PROCEDURE — 80053 COMPREHEN METABOLIC PANEL: CPT | Performed by: EMERGENCY MEDICINE

## 2021-03-12 PROCEDURE — 83690 ASSAY OF LIPASE: CPT

## 2021-03-12 PROCEDURE — 74177 CT ABD & PELVIS W/CONTRAST: CPT | Performed by: EMERGENCY MEDICINE

## 2021-03-12 PROCEDURE — 81003 URINALYSIS AUTO W/O SCOPE: CPT | Performed by: EMERGENCY MEDICINE

## 2021-03-12 PROCEDURE — 85025 COMPLETE CBC W/AUTO DIFF WBC: CPT | Performed by: EMERGENCY MEDICINE

## 2021-03-12 PROCEDURE — 96361 HYDRATE IV INFUSION ADD-ON: CPT

## 2021-03-12 PROCEDURE — 83690 ASSAY OF LIPASE: CPT | Performed by: EMERGENCY MEDICINE

## 2021-03-12 RX ORDER — ONDANSETRON 2 MG/ML
4 INJECTION INTRAMUSCULAR; INTRAVENOUS ONCE
Status: DISCONTINUED | OUTPATIENT
Start: 2021-03-12 | End: 2021-03-13

## 2021-03-13 RX ORDER — HYDROCODONE BITARTRATE AND ACETAMINOPHEN 5; 325 MG/1; MG/1
1 TABLET ORAL EVERY 6 HOURS PRN
Qty: 10 TABLET | Refills: 0 | Status: SHIPPED | OUTPATIENT
Start: 2021-03-13 | End: 2021-03-23

## 2021-03-13 RX ORDER — ONDANSETRON 4 MG/1
4 TABLET, ORALLY DISINTEGRATING ORAL EVERY 4 HOURS PRN
Qty: 10 TABLET | Refills: 0 | Status: SHIPPED | OUTPATIENT
Start: 2021-03-13 | End: 2021-03-20

## 2021-03-13 NOTE — ED INITIAL ASSESSMENT (HPI)
Patient here with c/o midsternal abdominal pain, and left mid abdominal pain. Patient nauseated and has chills. Denies fevers, vomiting and diarrhea. Symptoms started today.

## 2021-03-13 NOTE — ED PROVIDER NOTES
Patient Seen in: [facility] Emergency Department      History   Patient presents with:  Abdomen/Flank Pain    Stated Complaint: abd pain     HPI/Subjective:   HPI    This is a very pleasant 80-year-old female who presents with abdominal pain.   Sean pain   Other systems are as noted in HPI. Constitutional and vital signs reviewed. All other systems reviewed and negative except as noted above.     Physical Exam     ED Triage Vitals [03/12/21 1945]   /83   Pulse 72   Resp 18   Temp 97.4 °F (3 CBC W/ DIFFERENTIAL[[id number]]          Abnormal            Final result                 Please view results for these tests on the individual orders.    SCAN SLIDE   RAINBOW DRAW LAVENDER   RAINBOW DRAW LIGHT GREEN   RAINBOW DRAW GOLD     EKG    Rate, in Irene Lockhart  [place]  [address] [facility]    On 3/16/2021      Gm Mcnair MD  [address] [facility]  [phone]    On 3/15/2021            Medications Prescribed:  Current Discharge M

## 2021-03-15 LAB
ATRIAL RATE: 61 BPM
ATRIAL RATE: 65 BPM
P AXIS: -18 DEGREES
P AXIS: 76 DEGREES
P-R INTERVAL: 146 MS
P-R INTERVAL: 156 MS
Q-T INTERVAL: 422 MS
Q-T INTERVAL: 458 MS
QRS DURATION: 84 MS
QRS DURATION: 90 MS
QTC CALCULATION (BEZET): 438 MS
QTC CALCULATION (BEZET): 461 MS
R AXIS: -24 DEGREES
R AXIS: 78 DEGREES
T AXIS: -29 DEGREES
T AXIS: 67 DEGREES
VENTRICULAR RATE: 61 BPM
VENTRICULAR RATE: 65 BPM

## 2021-03-16 ENCOUNTER — TELEPHONE (OUTPATIENT)
Dept: INTERNAL MEDICINE CLINIC | Facility: CLINIC | Age: 56
End: 2021-03-16

## 2021-03-16 DIAGNOSIS — K80.50 BILIARY COLIC: Primary | ICD-10-CM

## 2021-03-16 NOTE — TELEPHONE ENCOUNTER
REFERRAL/URGENT  Received: Today  Aureliano WANG [facility] Clinical Staff  Cc: [facility] Referral Pool  Phone Number: [phone]   . Reason for the order/referral:GENERAL SURGERY/CONSULT   PCP: GRIFFIN   Refer to Provider Rory Keating   Specialty:

## 2021-03-18 ENCOUNTER — OFFICE VISIT (OUTPATIENT)
Dept: SURGERY | Facility: CLINIC | Age: 56
End: 2021-03-18

## 2021-03-18 VITALS
BODY MASS INDEX: 23.23 KG/M2 | HEART RATE: 77 BPM | TEMPERATURE: 97 F | SYSTOLIC BLOOD PRESSURE: 108 MMHG | WEIGHT: 148 LBS | DIASTOLIC BLOOD PRESSURE: 65 MMHG | HEIGHT: 67 IN

## 2021-03-18 DIAGNOSIS — K82.8 GALLBLADDER SLUDGE: ICD-10-CM

## 2021-03-18 DIAGNOSIS — C50.912 INVASIVE DUCTAL CARCINOMA OF LEFT BREAST IN FEMALE (HCC): ICD-10-CM

## 2021-03-18 DIAGNOSIS — K81.2 ACUTE ON CHRONIC CHOLECYSTITIS: Primary | ICD-10-CM

## 2021-03-18 PROCEDURE — 3008F BODY MASS INDEX DOCD: CPT | Performed by: SURGERY

## 2021-03-18 PROCEDURE — 3078F DIAST BP <80 MM HG: CPT | Performed by: SURGERY

## 2021-03-18 PROCEDURE — 3074F SYST BP LT 130 MM HG: CPT | Performed by: SURGERY

## 2021-03-18 PROCEDURE — 99245 OFF/OP CONSLTJ NEW/EST HI 55: CPT | Performed by: SURGERY

## 2021-03-18 RX ORDER — FLUTICASONE PROPIONATE 50 MCG
2 SPRAY, SUSPENSION (ML) NASAL DAILY
COMMUNITY
Start: 2021-03-01

## 2021-03-18 NOTE — H&P
New Patient Visit Note       Active Problems      1. Acute on chronic cholecystitis    2. Invasive ductal carcinoma of left breast in female [facility])        Chief Complaint   Patient presents with:  Gallbladder: NP - Ref by ER for gallbladder.  Pt denies nause Tab, Take 1 tablet (20 mg total) by mouth 4 (four) times daily as needed. , Disp: 30 tablet, Rfl: 0  •  Levothyroxine Sodium 25 MCG Oral Tab, Take 1 tablet (25 mcg total) by mouth before breakfast., Disp: 90 tablet, Rfl: 0  •  Liothyronine Sodium 25 MCG Ora Negative. Negative for chest pain and palpitations. Gastrointestinal: Positive for abdominal distention, abdominal pain and diarrhea. Negative for blood in stool, constipation, nausea and vomiting. Genitourinary: Negative.   Negative for difficulty uri pain.  Beverley Stevens is here with her  today. Of note, Beverley Stevens is currently undergoing treatment for breast cancer.   Her oncologist is Dr. Geraldine Candelaria    The patient presented to the [facility] emergency department 6 days ago due to severe epigastric and right uppe longstanding but appear to have worsened over the past year, she does wish to proceed with laparoscopic cholecystectomy with intraoperative cholangiogram for symptomatic biliary colic and gallbladder sludge.     Surgery will be scheduled once Gregoria Boudreaux has co

## 2021-03-18 NOTE — PATIENT INSTRUCTIONS
First episode 3-4 years ago   Fatty, greasy food trigger   Small twinges - took bentyl   Last week - eggs with butter - pain into the back   No N/V, no diarrhea, no fevers   Currently undergoing chemo - 2 cycles completed - 3rd is in 1 week - 4 total     A

## 2021-03-26 ENCOUNTER — OFFICE VISIT (OUTPATIENT)
Dept: HEMATOLOGY/ONCOLOGY | Age: 56
End: 2021-03-26
Attending: INTERNAL MEDICINE
Payer: COMMERCIAL

## 2021-03-26 VITALS
WEIGHT: 151.81 LBS | BODY MASS INDEX: 23.83 KG/M2 | DIASTOLIC BLOOD PRESSURE: 83 MMHG | TEMPERATURE: 98 F | RESPIRATION RATE: 18 BRPM | SYSTOLIC BLOOD PRESSURE: 125 MMHG | OXYGEN SATURATION: 95 % | HEART RATE: 82 BPM | HEIGHT: 66.97 IN

## 2021-03-26 DIAGNOSIS — C50.912 INVASIVE DUCTAL CARCINOMA OF LEFT BREAST IN FEMALE (HCC): Primary | ICD-10-CM

## 2021-03-26 DIAGNOSIS — C50.912 INVASIVE DUCTAL CARCINOMA OF LEFT BREAST IN FEMALE (HCC): ICD-10-CM

## 2021-03-26 LAB
ALBUMIN SERPL-MCNC: 3.7 G/DL (ref 3.4–5)
ALBUMIN/GLOB SERPL: 1.1 {RATIO} (ref 1–2)
ALP LIVER SERPL-CCNC: 73 U/L
ALT SERPL-CCNC: 27 U/L
ANION GAP SERPL CALC-SCNC: 4 MMOL/L (ref 0–18)
AST SERPL-CCNC: 21 U/L (ref 15–37)
BASOPHILS # BLD AUTO: 0.03 X10(3) UL (ref 0–0.2)
BASOPHILS NFR BLD AUTO: 0.6 %
BILIRUB SERPL-MCNC: 0.4 MG/DL (ref 0.1–2)
BUN BLD-MCNC: 11 MG/DL (ref 7–18)
BUN/CREAT SERPL: 14.3 (ref 10–20)
CALCIUM BLD-MCNC: 9.5 MG/DL (ref 8.5–10.1)
CHLORIDE SERPL-SCNC: 107 MMOL/L (ref 98–112)
CO2 SERPL-SCNC: 28 MMOL/L (ref 21–32)
CREAT BLD-MCNC: 0.77 MG/DL
DEPRECATED RDW RBC AUTO: 39.8 FL (ref 35.1–46.3)
EOSINOPHIL # BLD AUTO: 0.01 X10(3) UL (ref 0–0.7)
EOSINOPHIL NFR BLD AUTO: 0.2 %
ERYTHROCYTE [DISTWIDTH] IN BLOOD BY AUTOMATED COUNT: 12.7 % (ref 11–15)
GLOBULIN PLAS-MCNC: 3.4 G/DL (ref 2.8–4.4)
GLUCOSE BLD-MCNC: 97 MG/DL (ref 70–99)
HCT VFR BLD AUTO: 36.9 %
HGB BLD-MCNC: 12.2 G/DL
IMM GRANULOCYTES # BLD AUTO: 0.01 X10(3) UL (ref 0–1)
IMM GRANULOCYTES NFR BLD: 0.2 %
LYMPHOCYTES # BLD AUTO: 1.45 X10(3) UL (ref 1–4)
LYMPHOCYTES NFR BLD AUTO: 31.3 %
M PROTEIN MFR SERPL ELPH: 7.1 G/DL (ref 6.4–8.2)
MCH RBC QN AUTO: 28.8 PG (ref 26–34)
MCHC RBC AUTO-ENTMCNC: 33.1 G/DL (ref 31–37)
MCV RBC AUTO: 87.2 FL
MONOCYTES # BLD AUTO: 0.41 X10(3) UL (ref 0.1–1)
MONOCYTES NFR BLD AUTO: 8.8 %
NEUTROPHILS # BLD AUTO: 2.73 X10 (3) UL (ref 1.5–7.7)
NEUTROPHILS # BLD AUTO: 2.73 X10(3) UL (ref 1.5–7.7)
NEUTROPHILS NFR BLD AUTO: 58.9 %
OSMOLALITY SERPL CALC.SUM OF ELEC: 287 MOSM/KG (ref 275–295)
PATIENT FASTING Y/N/NP: NO
PLATELET # BLD AUTO: 286 10(3)UL (ref 150–450)
POTASSIUM SERPL-SCNC: 3.7 MMOL/L (ref 3.5–5.1)
RBC # BLD AUTO: 4.23 X10(6)UL
SODIUM SERPL-SCNC: 139 MMOL/L (ref 136–145)
WBC # BLD AUTO: 4.6 X10(3) UL (ref 4–11)

## 2021-03-26 PROCEDURE — 96413 CHEMO IV INFUSION 1 HR: CPT

## 2021-03-26 PROCEDURE — 96417 CHEMO IV INFUS EACH ADDL SEQ: CPT

## 2021-03-26 PROCEDURE — 99215 OFFICE O/P EST HI 40 MIN: CPT | Performed by: INTERNAL MEDICINE

## 2021-03-26 PROCEDURE — 96375 TX/PRO/DX INJ NEW DRUG ADDON: CPT

## 2021-03-26 NOTE — PROGRESS NOTES
Cancer Center Progress Note    Problem List:      Patient Active Problem List:     Postoperative hypothyroidism     Mass of parapharyngeal space     Congenital hearing loss     Hx of thyroid cancer     Hx of colonic polyps     Atrophic vaginitis - Estrogen Excision, left breast, anterior margin:   -Benign breast tissue.      E- Excision, left breast, inferior margin:   -Invasive ductal carcinoma, grade 3, measuring 1 mm (0.1 cm) in maximal dimension.  -Invasive tumor measures 2 mm (0.2 cm) from nearest new i MD.      Vital Signs:      Height: 170.1 cm (5' 6.97\") (03/26 9622)  Weight: 68.9 kg (151 lb 12.8 oz) (03/26 0923)  BSA (Calculated - sq m): 1.8 sq meters (03/26 0923)  Pulse: 82 (03/26 0923)  BP: 125/83 (03/26 0923)  Temp: 97.6 °F (36.4 °C) (03/26 0923) amplified  Lumpectomy on 12/18/2020  1.2 cm IDC  1/2 nodes with micromet (1 mm)  Oncotype Dx RS 27    She did reasonably well on the second cycle of TC. She will continue with the third cycle. She had palpitations.  She was instructed to come to the ED if s

## 2021-03-30 ENCOUNTER — TELEPHONE (OUTPATIENT)
Dept: SURGERY | Facility: CLINIC | Age: 56
End: 2021-03-30

## 2021-03-30 DIAGNOSIS — K81.2 ACUTE ON CHRONIC CHOLECYSTITIS: Primary | ICD-10-CM

## 2021-03-31 ENCOUNTER — PATIENT MESSAGE (OUTPATIENT)
Dept: HEMATOLOGY/ONCOLOGY | Age: 56
End: 2021-03-31

## 2021-03-31 NOTE — TELEPHONE ENCOUNTER
From: Denisse Estrella  To: Ramila Rayo MD  Sent: 3/31/2021 10:38 AM CDT  Subject: Other    I am experiencing SOB with minimal activity and pressure sensation between breaststroke past two days, along with extreme fatigue.     Normal SE's, or is chemo da

## 2021-04-16 ENCOUNTER — OFFICE VISIT (OUTPATIENT)
Dept: HEMATOLOGY/ONCOLOGY | Age: 56
End: 2021-04-16
Attending: INTERNAL MEDICINE
Payer: COMMERCIAL

## 2021-04-16 VITALS
HEIGHT: 66.97 IN | HEART RATE: 75 BPM | BODY MASS INDEX: 23.39 KG/M2 | OXYGEN SATURATION: 99 % | TEMPERATURE: 98 F | WEIGHT: 149 LBS | DIASTOLIC BLOOD PRESSURE: 72 MMHG | RESPIRATION RATE: 18 BRPM | SYSTOLIC BLOOD PRESSURE: 118 MMHG

## 2021-04-16 DIAGNOSIS — C50.912 INVASIVE DUCTAL CARCINOMA OF LEFT BREAST IN FEMALE (HCC): Primary | ICD-10-CM

## 2021-04-16 DIAGNOSIS — C50.912 INVASIVE DUCTAL CARCINOMA OF LEFT BREAST IN FEMALE (HCC): ICD-10-CM

## 2021-04-16 PROCEDURE — 96417 CHEMO IV INFUS EACH ADDL SEQ: CPT

## 2021-04-16 PROCEDURE — 96413 CHEMO IV INFUSION 1 HR: CPT

## 2021-04-16 PROCEDURE — 96375 TX/PRO/DX INJ NEW DRUG ADDON: CPT

## 2021-04-16 PROCEDURE — 99215 OFFICE O/P EST HI 40 MIN: CPT | Performed by: INTERNAL MEDICINE

## 2021-04-16 NOTE — PROGRESS NOTES
Cancer Center Progress Note    Problem List:      Patient Active Problem List:     Postoperative hypothyroidism     Mass of parapharyngeal space     Congenital hearing loss     Hx of thyroid cancer     Hx of colonic polyps     Atrophic vaginitis - Estrogen ductal carcinoma, grade 3, measuring 1 mm (0.1 cm) in maximal dimension.  -Invasive tumor measures 2 mm (0.2 cm) from nearest new inferior margin of excision.  -Ductal carcinoma in situ (DCIS), nuclear grade 2, solid type.   -DCIS measures 2 mm (0.2 cm) fro 0  Liothyronine Sodium 25 MCG Oral Tab, Take 1/2 tablet by mouth daily. , Disp: 90 tablet, Rfl: 0  thyroid (NP THYROID) 60 MG Oral Tab, Take 1 tablet (60 mg total) by mouth daily. , Disp: 90 tablet, Rfl: 0  Prochlorperazine Maleate (COMPAZINE) 10 mg tablet, for this visit.        Labs reviewed at this visit:     Lab Results   Component Value Date    WBC 6.2 04/16/2021    RBC 4.13 04/16/2021    HGB 11.9 (L) 04/16/2021    HCT 35.9 04/16/2021    MCV 86.9 04/16/2021    MCH 28.8 04/16/2021    MCHC 33.1 04/16/2021

## 2021-04-16 NOTE — PROGRESS NOTES
Pt here for C4D1.   Arrives Ambulating independently, accompanied by Self and Spouse           Pregnancy screening: Not applicable    Modifications in dose or schedule: No     Frequency of blood return and site check throughout administration: Prior to [facility]

## 2021-04-19 ENCOUNTER — NURSE NAVIGATOR ENCOUNTER (OUTPATIENT)
Dept: HEMATOLOGY/ONCOLOGY | Facility: HOSPITAL | Age: 56
End: 2021-04-19

## 2021-04-19 NOTE — PROGRESS NOTES
LMOVMTCB regarding patient following up with radiation therapy. Awaiting phone call back from patient.

## 2021-04-20 ENCOUNTER — TELEPHONE (OUTPATIENT)
Dept: SURGERY | Facility: CLINIC | Age: 56
End: 2021-04-20

## 2021-04-20 DIAGNOSIS — K81.2 ACUTE ON CHRONIC CHOLECYSTITIS: Primary | ICD-10-CM

## 2021-04-20 NOTE — TELEPHONE ENCOUNTER
Rec'd a message from P.A. T. Dept at [facility] stating pt wants to CX.  I called pt and she wants to try a non surgical method of treating, does not want to have surgery

## 2021-04-21 ENCOUNTER — PATIENT MESSAGE (OUTPATIENT)
Dept: HEMATOLOGY/ONCOLOGY | Age: 56
End: 2021-04-21

## 2021-04-21 NOTE — TELEPHONE ENCOUNTER
From: Ruddy Gandara  To: Pat Colon MD  Sent: 4/21/2021 11:14 AM CDT  Subject: Prescription Question    Can I have refill on Nystatin (big bottle) as helps with thrush? Thanks!

## 2021-04-29 ENCOUNTER — HOSPITAL ENCOUNTER (OUTPATIENT)
Dept: RADIATION ONCOLOGY | Facility: HOSPITAL | Age: 56
Discharge: HOME | End: 2021-04-29
Attending: RADIOLOGY
Payer: COMMERCIAL

## 2021-04-29 VITALS
TEMPERATURE: 98 F | OXYGEN SATURATION: 100 % | SYSTOLIC BLOOD PRESSURE: 100 MMHG | HEART RATE: 59 BPM | DIASTOLIC BLOOD PRESSURE: 67 MMHG | RESPIRATION RATE: 18 BRPM

## 2021-04-29 DIAGNOSIS — C50.412 CARCINOMA OF UPPER-OUTER QUADRANT OF LEFT BREAST IN FEMALE, ESTROGEN RECEPTOR POSITIVE (HCC): Primary | ICD-10-CM

## 2021-04-29 DIAGNOSIS — Z17.0 CARCINOMA OF UPPER-OUTER QUADRANT OF LEFT BREAST IN FEMALE, ESTROGEN RECEPTOR POSITIVE (HCC): Primary | ICD-10-CM

## 2021-04-29 PROCEDURE — 99214 OFFICE O/P EST MOD 30 MIN: CPT

## 2021-04-29 NOTE — PATIENT INSTRUCTIONS
Our therapists will call you to schedule your CT Simulation. Please call if you have any questions.  [phone]

## 2021-04-29 NOTE — PROGRESS NOTES
Nursing Consultation Note  Patient: Norma Soto  YOB: 1965  Age: 55year old  Radiation Oncologist: Dr. Brittani Guajardo  Referring Physician: [email or web address]  Consult Date: 4/29/2021      Chemotherapy: Completed  Labs: needed. 200 mL 0   • Fluticasone Propionate 50 MCG/ACT Nasal Suspension 2 sprays by Nasal route daily. • Dicyclomine HCl 20 MG Oral Tab Take 1 tablet (20 mg total) by mouth 4 (four) times daily as needed.  30 tablet 0   • Levothyroxine Sodium 25 MCG Ora ([id number])     • THYROIDECTOMY  1983    d/t papillary carcinoma    near complete thyroidectomy          Social History    Socioeconomic History      Marital status:       Spouse name: Not on file      Number of children: Not on file      Years of e able to carry on all predisease activities without restrictions.       Education:  Knowledge Deficit Plan Of Care:    Problem:  Knowledge Deficit    Problems related to:    Radiation therapy    Interventions:  Assess patient knowledge level  Assess knowledg

## 2021-04-29 NOTE — CONSULTS
[facility]     PATIENT:   Cristela Carl MD:  Luna Mary MD      DIAGNOSIS:   T1c N1mi M0 left upper outer breast CA        CC:    L Breast CA    HPI   55-year-old woman here with her .     Sc recurrence score is 27, 21% distant recurrence risk at 9 years with endocrine therapy alone. Has completed adjuvant TC x 4 chemotherapy, last was 2 weeks ago.     PMH/PSH   Thyroid cancer at age 17 status post surgery without adjuvant therapy, endometriosi third, far lateral, just ceph to nipple level    -Planning on alternating mammography and MRI surveillance    -Discussed short and long-term effects of radiation therapy.   She has discussed her SANTI mutation all along and favors breast conserving therapy in

## 2021-04-30 ENCOUNTER — HOSPITAL ENCOUNTER (OUTPATIENT)
Dept: RADIATION ONCOLOGY | Facility: HOSPITAL | Age: 56
Discharge: HOME | End: 2021-04-30
Attending: RADIOLOGY
Payer: COMMERCIAL

## 2021-04-30 PROCEDURE — 77290 THER RAD SIMULAJ FIELD CPLX: CPT | Performed by: RADIOLOGY

## 2021-04-30 PROCEDURE — 77470 SPECIAL RADIATION TREATMENT: CPT | Performed by: RADIOLOGY

## 2021-04-30 PROCEDURE — 77334 RADIATION TREATMENT AID(S): CPT | Performed by: RADIOLOGY

## 2021-05-01 ENCOUNTER — HOSPITAL ENCOUNTER (OUTPATIENT)
Dept: RADIATION ONCOLOGY | Facility: HOSPITAL | Age: 56
Discharge: HOME | End: 2021-05-01
Attending: RADIOLOGY
Payer: COMMERCIAL

## 2021-05-04 DIAGNOSIS — Z17.0 CARCINOMA OF UPPER-OUTER QUADRANT OF LEFT BREAST IN FEMALE, ESTROGEN RECEPTOR POSITIVE (HCC): Primary | ICD-10-CM

## 2021-05-04 DIAGNOSIS — C50.412 CARCINOMA OF UPPER-OUTER QUADRANT OF LEFT BREAST IN FEMALE, ESTROGEN RECEPTOR POSITIVE (HCC): Primary | ICD-10-CM

## 2021-05-05 RX ORDER — LEVOTHYROXINE AND LIOTHYRONINE 38; 9 UG/1; UG/1
60 TABLET ORAL DAILY
Qty: 90 TABLET | Refills: 0 | Status: SHIPPED | OUTPATIENT
Start: 2021-05-05 | End: 2021-07-28

## 2021-05-05 RX ORDER — LEVOTHYROXINE SODIUM 0.03 MG/1
25 TABLET ORAL
Qty: 90 TABLET | Refills: 0 | Status: SHIPPED | OUTPATIENT
Start: 2021-05-05 | End: 2021-07-28

## 2021-05-05 NOTE — TELEPHONE ENCOUNTER
Failed protocol - TSH value between 0.350 and 5.500 IU/ml    Requesting Levothyroxine Sodium 25 MCG Oral Tab  LOV: 12/14/20  RTC: NA  Last Relevant Labs: 10/8/20  Filled: 2/3/21 #90 with 0 refills      Requesting thyroid (NP THYROID) 60 MG Oral Tab  Filled

## 2021-05-07 ENCOUNTER — OFFICE VISIT (OUTPATIENT)
Dept: HEMATOLOGY/ONCOLOGY | Age: 56
End: 2021-05-07
Attending: INTERNAL MEDICINE
Payer: COMMERCIAL

## 2021-05-07 VITALS
WEIGHT: 151 LBS | HEART RATE: 73 BPM | OXYGEN SATURATION: 100 % | RESPIRATION RATE: 18 BRPM | DIASTOLIC BLOOD PRESSURE: 70 MMHG | SYSTOLIC BLOOD PRESSURE: 118 MMHG | TEMPERATURE: 99 F | BODY MASS INDEX: 23.7 KG/M2 | HEIGHT: 66.97 IN

## 2021-05-07 DIAGNOSIS — Z78.0 ASYMPTOMATIC MENOPAUSE: ICD-10-CM

## 2021-05-07 DIAGNOSIS — C50.912 INVASIVE DUCTAL CARCINOMA OF LEFT BREAST IN FEMALE (HCC): Primary | ICD-10-CM

## 2021-05-07 PROCEDURE — 99215 OFFICE O/P EST HI 40 MIN: CPT | Performed by: INTERNAL MEDICINE

## 2021-05-07 RX ORDER — ANASTROZOLE 1 MG/1
1 TABLET ORAL DAILY
Qty: 90 TABLET | Refills: 3 | Status: SHIPPED | OUTPATIENT
Start: 2021-05-07

## 2021-05-07 NOTE — PROGRESS NOTES
Cancer Center Progress Note    Problem List:      Patient Active Problem List:     Postoperative hypothyroidism     Mass of parapharyngeal space     Congenital hearing loss     Hx of thyroid cancer     Hx of colonic polyps     Atrophic vaginitis - Estrogen Excision, left breast, inferior margin:   -Invasive ductal carcinoma, grade 3, measuring 1 mm (0.1 cm) in maximal dimension.  -Invasive tumor measures 2 mm (0.2 cm) from nearest new inferior margin of excision.  -Ductal carcinoma in situ (DCIS), nuclear gr Propionate 50 MCG/ACT Nasal Suspension, 2 sprays by Nasal route daily. , Disp: , Rfl:   Dicyclomine HCl 20 MG Oral Tab, Take 1 tablet (20 mg total) by mouth 4 (four) times daily as needed. , Disp: 30 tablet, Rfl: 0  Liothyronine Sodium 25 MCG Oral Tab, Take Component Value Date     05/07/2021    K 4.5 05/07/2021     05/07/2021    CO2 29.0 05/07/2021    BUN 13 05/07/2021    CREATSERUM 0.84 05/07/2021     (H) 05/07/2021    CA 9.2 05/07/2021    ALKPHO 73 05/07/2021    ALT 56 05/07/2021    AS

## 2021-05-07 NOTE — PROGRESS NOTES
Outpatient Oncology Care Plan  Problem list:  knowledge deficit    Problems related to:    disease/disease progression    Interventions:  provided general teaching    Expected outcomes:  understands plan of care    Progress towards outcome:  making progres This is a recent snapshot of the patient's [facility] medical record.  For current drug dose and complete information and questions, call [phone] or In Basket pool, [facility] home infusion ([id number])  CSN Number:  [id number]

## 2021-05-11 PROCEDURE — 77334 RADIATION TREATMENT AID(S): CPT | Performed by: RADIOLOGY

## 2021-05-11 PROCEDURE — 77293 RESPIRATOR MOTION MGMT SIMUL: CPT | Performed by: RADIOLOGY

## 2021-05-11 PROCEDURE — 77295 3-D RADIOTHERAPY PLAN: CPT | Performed by: RADIOLOGY

## 2021-05-11 PROCEDURE — 77300 RADIATION THERAPY DOSE PLAN: CPT | Performed by: RADIOLOGY

## 2021-05-18 ENCOUNTER — HOSPITAL ENCOUNTER (OUTPATIENT)
Dept: RADIATION ONCOLOGY | Facility: HOSPITAL | Age: 56
Discharge: HOME | End: 2021-05-18
Attending: RADIOLOGY
Payer: COMMERCIAL

## 2021-05-18 PROCEDURE — 77280 THER RAD SIMULAJ FIELD SMPL: CPT | Performed by: RADIOLOGY

## 2021-05-18 PROCEDURE — 77412 RADIATION TX DELIVERY LVL 3: CPT | Performed by: RADIOLOGY

## 2021-05-19 PROCEDURE — 77331 SPECIAL RADIATION DOSIMETRY: CPT | Performed by: RADIOLOGY

## 2021-05-19 PROCEDURE — 77387 GUIDANCE FOR RADJ TX DLVR: CPT | Performed by: RADIOLOGY

## 2021-05-19 PROCEDURE — 77412 RADIATION TX DELIVERY LVL 3: CPT | Performed by: RADIOLOGY

## 2021-05-20 ENCOUNTER — MED REC SCAN ONLY (OUTPATIENT)
Dept: SURGERY | Facility: CLINIC | Age: 56
End: 2021-05-20

## 2021-05-20 PROCEDURE — 77387 GUIDANCE FOR RADJ TX DLVR: CPT | Performed by: RADIOLOGY

## 2021-05-20 PROCEDURE — 77412 RADIATION TX DELIVERY LVL 3: CPT | Performed by: RADIOLOGY

## 2021-05-20 NOTE — TELEPHONE ENCOUNTER
Patient wants to cancel her appointment on Monday, May 24,2021 at 9:45. I did not cancel it. Please call patient.

## 2021-05-21 PROCEDURE — 77412 RADIATION TX DELIVERY LVL 3: CPT | Performed by: RADIOLOGY

## 2021-05-21 PROCEDURE — 77336 RADIATION PHYSICS CONSULT: CPT | Performed by: RADIOLOGY

## 2021-05-21 PROCEDURE — 77387 GUIDANCE FOR RADJ TX DLVR: CPT | Performed by: RADIOLOGY

## 2021-05-24 ENCOUNTER — TELEPHONE (OUTPATIENT)
Dept: RADIATION ONCOLOGY | Facility: HOSPITAL | Age: 56
End: 2021-05-24

## 2021-05-24 ENCOUNTER — APPOINTMENT (OUTPATIENT)
Dept: RADIATION ONCOLOGY | Facility: HOSPITAL | Age: 56
End: 2021-05-24
Attending: RADIOLOGY
Payer: COMMERCIAL

## 2021-05-24 NOTE — TELEPHONE ENCOUNTER
TC received from pt stating that she is currently stranded in [place] due to vehicle trouble and will call us tonight or tomorrow morning to let us know if she can make it to treatment tomorrow.

## 2021-05-25 ENCOUNTER — HOSPITAL ENCOUNTER (OUTPATIENT)
Dept: RADIATION ONCOLOGY | Facility: HOSPITAL | Age: 56
End: 2021-05-25
Attending: RADIOLOGY
Payer: COMMERCIAL

## 2021-05-26 ENCOUNTER — TELEPHONE (OUTPATIENT)
Dept: RADIATION ONCOLOGY | Facility: HOSPITAL | Age: 56
End: 2021-05-26

## 2021-05-26 ENCOUNTER — HOSPITAL ENCOUNTER (OUTPATIENT)
Dept: RADIATION ONCOLOGY | Facility: HOSPITAL | Age: 56
Discharge: HOME | End: 2021-05-26
Attending: RADIOLOGY
Payer: COMMERCIAL

## 2021-05-26 ENCOUNTER — HOSPITAL ENCOUNTER (OUTPATIENT)
Dept: BONE DENSITY | Age: 56
Discharge: HOME | End: 2021-05-26
Attending: INTERNAL MEDICINE
Payer: COMMERCIAL

## 2021-05-26 DIAGNOSIS — C50.412 CARCINOMA OF UPPER-OUTER QUADRANT OF LEFT BREAST IN FEMALE, ESTROGEN RECEPTOR POSITIVE (HCC): Primary | ICD-10-CM

## 2021-05-26 DIAGNOSIS — Z78.0 ASYMPTOMATIC MENOPAUSE: ICD-10-CM

## 2021-05-26 DIAGNOSIS — Z17.0 CARCINOMA OF UPPER-OUTER QUADRANT OF LEFT BREAST IN FEMALE, ESTROGEN RECEPTOR POSITIVE (HCC): Primary | ICD-10-CM

## 2021-05-26 PROCEDURE — 77387 GUIDANCE FOR RADJ TX DLVR: CPT | Performed by: RADIOLOGY

## 2021-05-26 PROCEDURE — 77080 DXA BONE DENSITY AXIAL: CPT | Performed by: INTERNAL MEDICINE

## 2021-05-26 PROCEDURE — 77412 RADIATION TX DELIVERY LVL 3: CPT | Performed by: RADIOLOGY

## 2021-05-26 NOTE — TELEPHONE ENCOUNTER
Reshma Hernandez called. She states she has received several calls to set up an appointment for a COVID test. I did not see any documented phone calls. I transferred her to central scheduling because she mentioned one of the calls was from them.  Please reach out to

## 2021-05-26 NOTE — PROGRESS NOTES
[facility] Radiation Treatment Management Note 1-5    Patient:  Trista Butts  Age:  55year old  Visit Diagnosis:  Lt breast cancer  Primary Rad/Onc:  Dr. Efrem Moreno    Site Delivered Dose (cGy) Prescribed Dose (cGy) Fraction #   L

## 2021-05-27 PROCEDURE — 77387 GUIDANCE FOR RADJ TX DLVR: CPT | Performed by: RADIOLOGY

## 2021-05-27 PROCEDURE — 77412 RADIATION TX DELIVERY LVL 3: CPT | Performed by: RADIOLOGY

## 2021-05-28 PROCEDURE — 77412 RADIATION TX DELIVERY LVL 3: CPT | Performed by: RADIOLOGY

## 2021-05-28 PROCEDURE — 77387 GUIDANCE FOR RADJ TX DLVR: CPT | Performed by: RADIOLOGY

## 2021-06-01 ENCOUNTER — HOSPITAL ENCOUNTER (OUTPATIENT)
Dept: RADIATION ONCOLOGY | Facility: HOSPITAL | Age: 56
Discharge: HOME | End: 2021-06-01
Attending: RADIOLOGY
Payer: COMMERCIAL

## 2021-06-01 ENCOUNTER — HOSPITAL ENCOUNTER (OUTPATIENT)
Dept: RADIATION ONCOLOGY | Facility: HOSPITAL | Age: 56
End: 2021-06-01
Attending: RADIOLOGY
Payer: COMMERCIAL

## 2021-06-01 PROCEDURE — 77412 RADIATION TX DELIVERY LVL 3: CPT | Performed by: RADIOLOGY

## 2021-06-01 PROCEDURE — 77387 GUIDANCE FOR RADJ TX DLVR: CPT | Performed by: RADIOLOGY

## 2021-06-02 ENCOUNTER — HOSPITAL ENCOUNTER (OUTPATIENT)
Dept: RADIATION ONCOLOGY | Facility: HOSPITAL | Age: 56
End: 2021-06-02
Attending: RADIOLOGY
Payer: COMMERCIAL

## 2021-06-02 PROCEDURE — 77412 RADIATION TX DELIVERY LVL 3: CPT | Performed by: RADIOLOGY

## 2021-06-02 PROCEDURE — 77387 GUIDANCE FOR RADJ TX DLVR: CPT | Performed by: RADIOLOGY

## 2021-06-03 ENCOUNTER — HOSPITAL ENCOUNTER (OUTPATIENT)
Dept: RADIATION ONCOLOGY | Facility: HOSPITAL | Age: 56
Discharge: HOME | End: 2021-06-03
Attending: RADIOLOGY
Payer: COMMERCIAL

## 2021-06-03 VITALS
DIASTOLIC BLOOD PRESSURE: 71 MMHG | OXYGEN SATURATION: 100 % | TEMPERATURE: 98 F | HEART RATE: 70 BPM | RESPIRATION RATE: 20 BRPM | SYSTOLIC BLOOD PRESSURE: 112 MMHG

## 2021-06-03 PROCEDURE — 77387 GUIDANCE FOR RADJ TX DLVR: CPT | Performed by: RADIOLOGY

## 2021-06-03 PROCEDURE — 77412 RADIATION TX DELIVERY LVL 3: CPT | Performed by: RADIOLOGY

## 2021-06-03 NOTE — PROGRESS NOTES
[facility] Radiation Treatment Management Note 6-10    Patient:  Kailyn Jaffe  Age:  55year old  Visit Diagnosis:  Lt breast cancer  Primary Rad/Onc:  Dr. Tani Benitez    Site Delivered Dose (cGy) Prescribed Dose (cGy) Fraction #

## 2021-06-04 PROCEDURE — 77412 RADIATION TX DELIVERY LVL 3: CPT | Performed by: RADIOLOGY

## 2021-06-04 PROCEDURE — 77336 RADIATION PHYSICS CONSULT: CPT | Performed by: RADIOLOGY

## 2021-06-04 PROCEDURE — 77387 GUIDANCE FOR RADJ TX DLVR: CPT | Performed by: RADIOLOGY

## 2021-06-07 ENCOUNTER — HOSPITAL ENCOUNTER (OUTPATIENT)
Dept: RADIATION ONCOLOGY | Facility: HOSPITAL | Age: 56
Discharge: HOME | End: 2021-06-07
Attending: RADIOLOGY
Payer: COMMERCIAL

## 2021-06-07 VITALS — RESPIRATION RATE: 16 BRPM | TEMPERATURE: 98 F

## 2021-06-07 DIAGNOSIS — C50.412 CARCINOMA OF UPPER-OUTER QUADRANT OF LEFT BREAST IN FEMALE, ESTROGEN RECEPTOR POSITIVE (HCC): Primary | ICD-10-CM

## 2021-06-07 DIAGNOSIS — Z17.0 CARCINOMA OF UPPER-OUTER QUADRANT OF LEFT BREAST IN FEMALE, ESTROGEN RECEPTOR POSITIVE (HCC): Primary | ICD-10-CM

## 2021-06-07 PROCEDURE — 77412 RADIATION TX DELIVERY LVL 3: CPT | Performed by: RADIOLOGY

## 2021-06-07 PROCEDURE — 77387 GUIDANCE FOR RADJ TX DLVR: CPT | Performed by: RADIOLOGY

## 2021-06-07 NOTE — PROGRESS NOTES
[facility] Radiation Treatment Management Note 11-15    Patient:  Eduardo Patino  Age:  55year old  Visit Diagnosis:  Lt breast cancer  Primary Rad/Onc:  Dr. Dana Escobar    Site Delivered Dose (cGy) Prescribed Dose (cGy) Fraction #

## 2021-06-07 NOTE — ADDENDUM NOTE
Encounter addended by: Eleazar Gosselin, MD on: 6/7/2021 9:25 AM   Actions taken: Visit diagnoses modified

## 2021-06-08 ENCOUNTER — APPOINTMENT (OUTPATIENT)
Dept: RADIATION ONCOLOGY | Facility: HOSPITAL | Age: 56
End: 2021-06-08
Attending: RADIOLOGY
Payer: COMMERCIAL

## 2021-06-08 PROCEDURE — 77387 GUIDANCE FOR RADJ TX DLVR: CPT | Performed by: RADIOLOGY

## 2021-06-08 PROCEDURE — 77412 RADIATION TX DELIVERY LVL 3: CPT | Performed by: RADIOLOGY

## 2021-06-09 PROCEDURE — 77290 THER RAD SIMULAJ FIELD CPLX: CPT | Performed by: RADIOLOGY

## 2021-06-09 PROCEDURE — 77412 RADIATION TX DELIVERY LVL 3: CPT | Performed by: RADIOLOGY

## 2021-06-09 PROCEDURE — 77334 RADIATION TREATMENT AID(S): CPT | Performed by: RADIOLOGY

## 2021-06-09 PROCEDURE — 77321 SPECIAL TELETX PORT PLAN: CPT | Performed by: RADIOLOGY

## 2021-06-10 ENCOUNTER — HOSPITAL ENCOUNTER (OUTPATIENT)
Dept: RADIATION ONCOLOGY | Facility: HOSPITAL | Age: 56
Discharge: HOME | End: 2021-06-10
Attending: RADIOLOGY
Payer: COMMERCIAL

## 2021-06-10 PROCEDURE — 77412 RADIATION TX DELIVERY LVL 3: CPT | Performed by: RADIOLOGY

## 2021-06-10 PROCEDURE — 77280 THER RAD SIMULAJ FIELD SMPL: CPT | Performed by: RADIOLOGY

## 2021-06-11 PROCEDURE — 77412 RADIATION TX DELIVERY LVL 3: CPT | Performed by: RADIOLOGY

## 2021-06-11 PROCEDURE — 77336 RADIATION PHYSICS CONSULT: CPT | Performed by: RADIOLOGY

## 2021-06-14 ENCOUNTER — HOSPITAL ENCOUNTER (OUTPATIENT)
Dept: RADIATION ONCOLOGY | Facility: HOSPITAL | Age: 56
Discharge: HOME | End: 2021-06-14
Attending: RADIOLOGY
Payer: COMMERCIAL

## 2021-06-14 VITALS
RESPIRATION RATE: 16 BRPM | HEART RATE: 65 BPM | TEMPERATURE: 97 F | OXYGEN SATURATION: 100 % | DIASTOLIC BLOOD PRESSURE: 76 MMHG | SYSTOLIC BLOOD PRESSURE: 119 MMHG

## 2021-06-14 DIAGNOSIS — C50.412 CARCINOMA OF UPPER-OUTER QUADRANT OF LEFT BREAST IN FEMALE, ESTROGEN RECEPTOR POSITIVE (HCC): Primary | ICD-10-CM

## 2021-06-14 DIAGNOSIS — Z17.0 CARCINOMA OF UPPER-OUTER QUADRANT OF LEFT BREAST IN FEMALE, ESTROGEN RECEPTOR POSITIVE (HCC): Primary | ICD-10-CM

## 2021-06-14 PROCEDURE — 77412 RADIATION TX DELIVERY LVL 3: CPT | Performed by: RADIOLOGY

## 2021-06-14 NOTE — PATIENT INSTRUCTIONS
POST-RADIATION INSTRUCTIONS:   - CALL [phone] FOR A FOLLOW-UP WITH DR. DEL CID IN SEPTEMBER  - FOLLOW-UP WITH DR. SINGLETON AFTER RADIATION COMPLETION  - FOLLOW-UP WITH DR. Aida Fitzgerald AFTER RADIATION COMPLETION  - SIDE EFFECTS OF RADIATION WILL GRADUALLY SUBSID

## 2021-06-14 NOTE — PROGRESS NOTES
[facility] Radiation Treatment Management Note 16-20    Patient:  Eduardo Patino  Age:  55year old  Visit Diagnosis:  Lt breast cancer  Primary Rad/Onc:  Dr. Dana Escobar    Site Delivered Dose (cGy) Prescribed Dose (cGy) Fraction #

## 2021-06-15 PROCEDURE — 77412 RADIATION TX DELIVERY LVL 3: CPT | Performed by: RADIOLOGY

## 2021-06-16 PROCEDURE — 77412 RADIATION TX DELIVERY LVL 3: CPT | Performed by: RADIOLOGY

## 2021-06-17 ENCOUNTER — DOCUMENTATION ONLY (OUTPATIENT)
Dept: RADIATION ONCOLOGY | Facility: HOSPITAL | Age: 56
End: 2021-06-17

## 2021-06-17 PROCEDURE — 77412 RADIATION TX DELIVERY LVL 3: CPT | Performed by: RADIOLOGY

## 2021-06-17 PROCEDURE — 77336 RADIATION PHYSICS CONSULT: CPT | Performed by: RADIOLOGY

## 2021-06-22 ENCOUNTER — LAB ENCOUNTER (OUTPATIENT)
Dept: LAB | Facility: HOSPITAL | Age: 56
End: 2021-06-22
Attending: INTERNAL MEDICINE
Payer: COMMERCIAL

## 2021-06-22 DIAGNOSIS — Z01.818 PRE-OP TESTING: ICD-10-CM

## 2021-06-24 ENCOUNTER — ANESTHESIA (OUTPATIENT)
Dept: ENDOSCOPY | Facility: HOSPITAL | Age: 56
End: 2021-06-24
Payer: COMMERCIAL

## 2021-06-24 ENCOUNTER — ANESTHESIA EVENT (OUTPATIENT)
Dept: ENDOSCOPY | Facility: HOSPITAL | Age: 56
End: 2021-06-24
Payer: COMMERCIAL

## 2021-06-24 ENCOUNTER — HOSPITAL ENCOUNTER (OUTPATIENT)
Facility: HOSPITAL | Age: 56
Setting detail: HOSPITAL OUTPATIENT SURGERY
Discharge: HOME | End: 2021-06-24
Attending: INTERNAL MEDICINE | Admitting: INTERNAL MEDICINE
Payer: COMMERCIAL

## 2021-06-24 VITALS
BODY MASS INDEX: 23.23 KG/M2 | SYSTOLIC BLOOD PRESSURE: 137 MMHG | HEART RATE: 60 BPM | HEIGHT: 67 IN | DIASTOLIC BLOOD PRESSURE: 70 MMHG | RESPIRATION RATE: 16 BRPM | OXYGEN SATURATION: 98 % | WEIGHT: 148 LBS

## 2021-06-24 DIAGNOSIS — Z12.11 COLON CANCER SCREENING: ICD-10-CM

## 2021-06-24 DIAGNOSIS — K62.1 RECTAL POLYP: ICD-10-CM

## 2021-06-24 DIAGNOSIS — R93.2 ABNORMAL ULTRASOUND OF GALLBLADDER: ICD-10-CM

## 2021-06-24 DIAGNOSIS — Z01.818 PRE-OP TESTING: Primary | ICD-10-CM

## 2021-06-24 PROCEDURE — 0DJ08ZZ INSPECTION OF UPPER INTESTINAL TRACT, VIA NATURAL OR ARTIFICIAL OPENING ENDOSCOPIC: ICD-10-PCS | Performed by: INTERNAL MEDICINE

## 2021-06-24 PROCEDURE — BD47ZZZ ULTRASONOGRAPHY OF GASTROINTESTINAL TRACT: ICD-10-PCS | Performed by: INTERNAL MEDICINE

## 2021-06-24 PROCEDURE — 0DB68ZX EXCISION OF STOMACH, VIA NATURAL OR ARTIFICIAL OPENING ENDOSCOPIC, DIAGNOSTIC: ICD-10-PCS | Performed by: INTERNAL MEDICINE

## 2021-06-24 PROCEDURE — 0DB98ZX EXCISION OF DUODENUM, VIA NATURAL OR ARTIFICIAL OPENING ENDOSCOPIC, DIAGNOSTIC: ICD-10-PCS | Performed by: INTERNAL MEDICINE

## 2021-06-24 PROCEDURE — 88312 SPECIAL STAINS GROUP 1: CPT | Performed by: INTERNAL MEDICINE

## 2021-06-24 PROCEDURE — 88305 TISSUE EXAM BY PATHOLOGIST: CPT | Performed by: INTERNAL MEDICINE

## 2021-06-24 PROCEDURE — 0DBP8ZX EXCISION OF RECTUM, VIA NATURAL OR ARTIFICIAL OPENING ENDOSCOPIC, DIAGNOSTIC: ICD-10-PCS | Performed by: INTERNAL MEDICINE

## 2021-06-24 RX ORDER — SODIUM CHLORIDE, SODIUM LACTATE, POTASSIUM CHLORIDE, CALCIUM CHLORIDE 600; 310; 30; 20 MG/100ML; MG/100ML; MG/100ML; MG/100ML
INJECTION, SOLUTION INTRAVENOUS CONTINUOUS
Status: DISCONTINUED | OUTPATIENT
Start: 2021-06-24 | End: 2021-06-24

## 2021-06-24 RX ORDER — NALOXONE HYDROCHLORIDE 0.4 MG/ML
80 INJECTION, SOLUTION INTRAMUSCULAR; INTRAVENOUS; SUBCUTANEOUS AS NEEDED
Status: DISCONTINUED | OUTPATIENT
Start: 2021-06-24 | End: 2021-06-24

## 2021-06-24 RX ORDER — LIDOCAINE HYDROCHLORIDE 10 MG/ML
INJECTION, SOLUTION EPIDURAL; INFILTRATION; INTRACAUDAL; PERINEURAL AS NEEDED
Status: DISCONTINUED | OUTPATIENT
Start: 2021-06-24 | End: 2021-06-24 | Stop reason: SURG

## 2021-06-24 RX ADMIN — LIDOCAINE HYDROCHLORIDE 50 MG: 10 INJECTION, SOLUTION EPIDURAL; INFILTRATION; INTRACAUDAL; PERINEURAL at 12:03:00

## 2021-06-24 RX ADMIN — SODIUM CHLORIDE, SODIUM LACTATE, POTASSIUM CHLORIDE, CALCIUM CHLORIDE: 600; 310; 30; 20 INJECTION, SOLUTION INTRAVENOUS at 12:45:00

## 2021-06-24 RX ADMIN — SODIUM CHLORIDE, SODIUM LACTATE, POTASSIUM CHLORIDE, CALCIUM CHLORIDE: 600; 310; 30; 20 INJECTION, SOLUTION INTRAVENOUS at 12:01:00

## 2021-06-24 NOTE — ANESTHESIA PREPROCEDURE EVALUATION
Anesthesia PreOp Note    HPI:     Corazon Eng is a 55year old female who presents for preoperative consultation requested by: Tj Glasgow MD    Date of Surgery: 6/24/2021    Procedure(s):  COLONOSCOPY  ESOPHAGOGASTRODUODENOSCOPY (EGD)  ENDOSCOPI mouth 4 (four) times daily as needed. , Disp: 30 tablet, Rfl: 0, prn  Levothyroxine Sodium 25 MCG Oral Tab, Take 1 tablet (25 mcg total) by mouth before breakfast., Disp: 90 tablet, Rfl: 0, 6/24/2021 at 0545  thyroid (NP THYROID) 60 MG Oral Tab, Take 1 tabl status:       Spouse name: Not on file      Number of children: Not on file      Years of education: Not on file      Highest education level: Not on file    Occupational History      Not on file    Tobacco Use      Smoking status: Former Smoker MCH 28.8 05/07/2021    MCHC 33.0 05/07/2021    RDW 13.7 05/07/2021    .0 05/07/2021     Lab Results   Component Value Date     05/07/2021    K 4.5 05/07/2021     05/07/2021    CO2 29.0 05/07/2021    BUN 13 05/07/2021    CREATSERUM 0.84 0

## 2021-06-24 NOTE — OPERATIVE REPORT
PATIENT NAME: Susi Roy  MRN: [id number]  DATE OF OPERATION: 6/24/2021  PREOPERATIVE DIAGNOSIS:   1. Left upper quadrant pain  2. Abnormal imaging of the gallbladder with thickening of the gallbladder wall.   3. Screening for colon cancer, average ris stomach throughout with no evidence of ulcers, masses or other abnormalities. Retroflexion revealed a normal cardia and fundus. The antrum was normal and the pylorus was patent. 3. Normal duodenum to the second portion with no ulcers or masses seen.     Ra MD [facility]

## 2021-06-24 NOTE — ANESTHESIA POSTPROCEDURE EVALUATION
Patient: Leopoldo Kid    Procedure Summary     Date: 06/24/21 Room / Location: [address] [place] [address] ENDOSCOPY 01 / [address] [place] [address] ENDOSCOPY    Anesthesia Start: 7985 Anesthesia Stop:     Procedures:       COLONOSCOPY (N/A )      ESOPHAGOGASTRODUODENOSCOPY (EGD) (N/A )      ENDOSC

## 2021-06-25 NOTE — PROGRESS NOTES
6/25/2021  Scot Primer  [address] [facility] [address] [facility] [address]    Dear Emory Pabon,       Here are the biopsy/pathology findings from your recent EGD (upper endoscopy) and colonoscopy:     The biopsy/pathology findings from your upper endoscopy showed:

## 2021-07-23 ENCOUNTER — HOSPITAL ENCOUNTER (OUTPATIENT)
Dept: MAMMOGRAPHY | Facility: HOSPITAL | Age: 56
Discharge: HOME | End: 2021-07-23
Attending: SURGERY
Payer: COMMERCIAL

## 2021-07-23 DIAGNOSIS — C50.412 MALIGNANT NEOPLASM OF UPPER-OUTER QUADRANT OF LEFT BREAST IN FEMALE, ESTROGEN RECEPTOR POSITIVE (HCC): ICD-10-CM

## 2021-07-23 DIAGNOSIS — Z17.0 MALIGNANT NEOPLASM OF UPPER-OUTER QUADRANT OF LEFT BREAST IN FEMALE, ESTROGEN RECEPTOR POSITIVE (HCC): ICD-10-CM

## 2021-07-23 PROCEDURE — 77062 BREAST TOMOSYNTHESIS BI: CPT | Performed by: SURGERY

## 2021-07-23 PROCEDURE — 77066 DX MAMMO INCL CAD BI: CPT | Performed by: SURGERY

## 2021-07-27 ENCOUNTER — OFFICE VISIT (OUTPATIENT)
Dept: SURGERY | Facility: CLINIC | Age: 56
End: 2021-07-27

## 2021-07-27 VITALS
HEIGHT: 67 IN | WEIGHT: 150.38 LBS | DIASTOLIC BLOOD PRESSURE: 78 MMHG | BODY MASS INDEX: 23.6 KG/M2 | RESPIRATION RATE: 16 BRPM | OXYGEN SATURATION: 99 % | HEART RATE: 65 BPM | SYSTOLIC BLOOD PRESSURE: 117 MMHG

## 2021-07-27 DIAGNOSIS — Z17.0 MALIGNANT NEOPLASM OF UPPER-OUTER QUADRANT OF LEFT BREAST IN FEMALE, ESTROGEN RECEPTOR POSITIVE (HCC): Primary | ICD-10-CM

## 2021-07-27 DIAGNOSIS — C50.412 MALIGNANT NEOPLASM OF UPPER-OUTER QUADRANT OF LEFT BREAST IN FEMALE, ESTROGEN RECEPTOR POSITIVE (HCC): Primary | ICD-10-CM

## 2021-07-27 PROCEDURE — 3008F BODY MASS INDEX DOCD: CPT | Performed by: SURGERY

## 2021-07-27 PROCEDURE — 3078F DIAST BP <80 MM HG: CPT | Performed by: SURGERY

## 2021-07-27 PROCEDURE — 3074F SYST BP LT 130 MM HG: CPT | Performed by: SURGERY

## 2021-07-27 PROCEDURE — 99214 OFFICE O/P EST MOD 30 MIN: CPT | Performed by: SURGERY

## 2021-07-28 NOTE — TELEPHONE ENCOUNTER
Liothyronine Sodium 25 MCG Oral Tab         Sig: Take 1/2 tablet by mouth daily.     Disp:  90 tablet    Refills:  0    Start: 7/28/2021    Class: Normal    Non-formulary    Last ordered: 5 months ago by Tiffanie Fowler MD     Thyroid Supplements Protocol Fa

## 2021-07-29 ENCOUNTER — OFFICE VISIT (OUTPATIENT)
Dept: INTERNAL MEDICINE CLINIC | Facility: CLINIC | Age: 56
End: 2021-07-29

## 2021-07-29 ENCOUNTER — LAB ENCOUNTER (OUTPATIENT)
Dept: LAB | Age: 56
End: 2021-07-29
Attending: FAMILY MEDICINE
Payer: COMMERCIAL

## 2021-07-29 VITALS
HEIGHT: 67 IN | HEART RATE: 64 BPM | SYSTOLIC BLOOD PRESSURE: 110 MMHG | DIASTOLIC BLOOD PRESSURE: 62 MMHG | WEIGHT: 151 LBS | RESPIRATION RATE: 16 BRPM | BODY MASS INDEX: 23.7 KG/M2

## 2021-07-29 DIAGNOSIS — B35.9 TINEA: Primary | ICD-10-CM

## 2021-07-29 DIAGNOSIS — E89.0 POSTOPERATIVE HYPOTHYROIDISM: ICD-10-CM

## 2021-07-29 LAB
T4 FREE SERPL-MCNC: 1 NG/DL (ref 0.8–1.7)
TSI SER-ACNC: <0.005 MIU/ML (ref 0.36–3.74)

## 2021-07-29 PROCEDURE — 84443 ASSAY THYROID STIM HORMONE: CPT

## 2021-07-29 PROCEDURE — 3074F SYST BP LT 130 MM HG: CPT | Performed by: FAMILY MEDICINE

## 2021-07-29 PROCEDURE — 36415 COLL VENOUS BLD VENIPUNCTURE: CPT

## 2021-07-29 PROCEDURE — 3078F DIAST BP <80 MM HG: CPT | Performed by: FAMILY MEDICINE

## 2021-07-29 PROCEDURE — 84439 ASSAY OF FREE THYROXINE: CPT

## 2021-07-29 PROCEDURE — 3008F BODY MASS INDEX DOCD: CPT | Performed by: FAMILY MEDICINE

## 2021-07-29 PROCEDURE — 99213 OFFICE O/P EST LOW 20 MIN: CPT | Performed by: FAMILY MEDICINE

## 2021-07-29 RX ORDER — KETOCONAZOLE 20 MG/G
CREAM TOPICAL
Qty: 60 G | Refills: 1 | Status: SHIPPED | OUTPATIENT
Start: 2021-07-29

## 2021-07-29 NOTE — PROGRESS NOTES
Melquiades Mejia is a 55year old female.   HPI:   Here for rash on the L chest below the breast area for 2-3 weeks   Itchy    Is done w radiation for 5 weeks so not related she believes   No DC  No pus     No meds used     Seems to go across to the leteral with surgery alone. No adjuvant therapy. • Disorder of thyroid    • Endometriosis    • Exposure to medical diagnostic radiation    • Parapharyngeal space mass    • Visual impairment     glasses. contacts      Social History:  Social History    Tobacco Use

## 2021-07-30 RX ORDER — LEVOTHYROXINE SODIUM 0.03 MG/1
25 TABLET ORAL
Qty: 90 TABLET | Refills: 0 | Status: SHIPPED | OUTPATIENT
Start: 2021-07-30 | End: 2021-10-29

## 2021-07-30 RX ORDER — LEVOTHYROXINE AND LIOTHYRONINE 38; 9 UG/1; UG/1
60 TABLET ORAL DAILY
Qty: 90 TABLET | Refills: 0 | Status: SHIPPED | OUTPATIENT
Start: 2021-07-30 | End: 2021-10-29

## 2021-07-30 RX ORDER — LIOTHYRONINE SODIUM 25 UG/1
TABLET ORAL
Qty: 90 TABLET | Refills: 0 | Status: SHIPPED | OUTPATIENT
Start: 2021-07-30 | End: 2021-10-29

## 2021-08-05 ENCOUNTER — TELEPHONE (OUTPATIENT)
Dept: HEMATOLOGY/ONCOLOGY | Facility: HOSPITAL | Age: 56
End: 2021-08-05

## 2021-08-05 NOTE — PROGRESS NOTES
Breast Surgery Surveillance Visit    Diagnosis: Left breast cancer status post left breast lumpectomy with left sentinel node biopsy on  12/18/20      Stage: [id number]    Disease Status:  Surgical treatment complete, radiation completed June 2021, tolerati SANTI mutation. She tolerated radiation without sequelae and is tolerating her anastrozole. She has no new clinical concerns related bilateral breast since her last visit. She is here today for evaluation and recommendations for further therapy.         Pa Take 1/2 tablet by mouth daily. , Disp: 90 tablet, Rfl: 0  clotrimazole-betamethasone 1-0.05 % External Cream, Apply to affected areas BID for 2 weeks, Disp: 60 g, Rfl: 0  Glutathione Does not apply Powder, Take 2 Doses/Fill by mouth 3 (three) times daily. snoring, pain in mouth/throat, hoarseness, change in voice, facial trauma.     Respiratory:  The patient denies chronic cough, +phlegm, hemoptysis, pleurisy/chest pain, pneumonia, asthma, wheezing, difficulty in breathing with exertion, emphysema, chronic b +poor/slow wound healing, weight loss/gain, fertility or hormone problems, cold intolerance, +thyroid disease. Allergic/Immunologic:  There is no history of hives, hay fever, angioedema or anaphylaxis.     /78 (BP Location: Left arm, Patient Posit skin appears normal. There are no suspicious appearing rashes or lesions. Extremities: The extremities are without deformity, cyanosis or edema.     Imaging: Bilateral diagnostic evaluation on July 23, 2021 shows extremely dense breast tissue with no margot

## 2021-09-05 ENCOUNTER — HOSPITAL ENCOUNTER (OUTPATIENT)
Dept: MRI IMAGING | Facility: HOSPITAL | Age: 56
Discharge: HOME | End: 2021-09-05
Attending: SURGERY
Payer: COMMERCIAL

## 2021-09-05 DIAGNOSIS — C50.412 MALIGNANT NEOPLASM OF UPPER-OUTER QUADRANT OF LEFT BREAST IN FEMALE, ESTROGEN RECEPTOR POSITIVE (HCC): ICD-10-CM

## 2021-09-05 DIAGNOSIS — Z17.0 MALIGNANT NEOPLASM OF UPPER-OUTER QUADRANT OF LEFT BREAST IN FEMALE, ESTROGEN RECEPTOR POSITIVE (HCC): ICD-10-CM

## 2021-09-05 PROCEDURE — A9575 INJ GADOTERATE MEGLUMI 0.1ML: HCPCS | Performed by: SURGERY

## 2021-09-05 PROCEDURE — 77049 MRI BREAST C-+ W/CAD BI: CPT | Performed by: SURGERY

## 2021-09-08 DIAGNOSIS — C50.912 MALIGNANT NEOPLASM OF LEFT FEMALE BREAST, UNSPECIFIED ESTROGEN RECEPTOR STATUS, UNSPECIFIED SITE OF BREAST (HCC): Primary | ICD-10-CM

## 2021-09-16 ENCOUNTER — NURSE NAVIGATOR ENCOUNTER (OUTPATIENT)
Dept: HEMATOLOGY/ONCOLOGY | Facility: HOSPITAL | Age: 56
End: 2021-09-16

## 2021-09-16 NOTE — PROGRESS NOTES
Pt phoned navigator for assistance with scheduling appointment with Dr. Marquise Canales. LVM for patient that we put her on his schedule for tomorrow, 9/17 at 9 am in [place].  Contact information provided, requested return phone call if this appointment does not work for

## 2021-09-17 ENCOUNTER — OFFICE VISIT (OUTPATIENT)
Dept: HEMATOLOGY/ONCOLOGY | Age: 56
End: 2021-09-17
Attending: INTERNAL MEDICINE
Payer: COMMERCIAL

## 2021-09-17 VITALS
OXYGEN SATURATION: 99 % | WEIGHT: 152.13 LBS | SYSTOLIC BLOOD PRESSURE: 137 MMHG | DIASTOLIC BLOOD PRESSURE: 81 MMHG | BODY MASS INDEX: 23.88 KG/M2 | HEIGHT: 66.97 IN | HEART RATE: 67 BPM | RESPIRATION RATE: 18 BRPM | TEMPERATURE: 98 F

## 2021-09-17 DIAGNOSIS — R21 RASH: ICD-10-CM

## 2021-09-17 DIAGNOSIS — C50.912 INVASIVE DUCTAL CARCINOMA OF LEFT BREAST IN FEMALE (HCC): Primary | ICD-10-CM

## 2021-09-17 PROCEDURE — 99213 OFFICE O/P EST LOW 20 MIN: CPT | Performed by: INTERNAL MEDICINE

## 2021-09-17 NOTE — PROGRESS NOTES
Patient presents with: Follow - Up    Patient her for MD follow up to breast cancer- Anastrozole 1 mg.   Patient c/o left breast pain with rash- red, itchy,  and swollen at times noticed about 6 weeks and hasn't improved prescribed Triamcinolone cream by gurinder

## 2021-09-17 NOTE — PROGRESS NOTES
Cancer Center Progress Note    Problem List:      Patient Active Problem List:     Postoperative hypothyroidism     Mass of parapharyngeal space     Congenital hearing loss     Hx of thyroid cancer     Hx of colonic polyps     Atrophic vaginitis - Estrogen for micrometastatic carcinoma in one of two lymph node (1 / 2 lymph nodes positive). -Largest metastatic focus measures 1 mm (0.1 cm).    -No extranodal extension present.     C- Excision, left breast, medial margin:   -Benign breast tissue.      D- Excis Cancer Mother 61   • Ashkenazi Islam Descent Self    • Breast Cancer Self 55        invasive ductal    • Cancer Maternal Grandmother         lymphoma     • Breast Cancer Maternal Great-Grandmother    • Breast Cancer Paternal Cousin    • Ovarian Cancer Cou CA 9.2 05/07/2021    ALKPHO 73 05/07/2021    ALT 56 05/07/2021    AST 37 05/07/2021    BILT 0.4 05/07/2021    ALB 3.5 05/07/2021    TP 6.8 05/07/2021       Radiologic imaging reviewed at this visit:    CXR on 11/17/2020:  FINDINGS:    LUNGS:  No focal c

## 2021-10-04 ENCOUNTER — LAB ENCOUNTER (OUTPATIENT)
Dept: LAB | Age: 56
End: 2021-10-04
Attending: DERMATOLOGY
Payer: COMMERCIAL

## 2021-10-04 DIAGNOSIS — C50.012 PAGET DISEASE OF BREAST, LEFT (HCC): ICD-10-CM

## 2021-10-04 PROCEDURE — 88305 TISSUE EXAM BY PATHOLOGIST: CPT

## 2021-10-05 NOTE — PROGRESS NOTES
[facility] RADIATION ONCOLOGY  TREATMENT SUMMARY     PATIENT:  Smitha Tinoco MD: Dorothy White MD  DIAGNOSIS:  L breast CA    HISTORY   46-year-old woman status post left upper outer breast lumpectomy shave margins, mastopexy, sentinel nod

## 2021-10-29 RX ORDER — LEVOTHYROXINE, LIOTHYRONINE 38; 9 UG/1; UG/1
TABLET ORAL
Qty: 90 TABLET | Refills: 0 | Status: SHIPPED | OUTPATIENT
Start: 2021-10-29 | End: 2022-01-31

## 2021-10-29 RX ORDER — LEVOTHYROXINE SODIUM 0.03 MG/1
TABLET ORAL
Qty: 90 TABLET | Refills: 0 | Status: SHIPPED | OUTPATIENT
Start: 2021-10-29 | End: 2022-01-31

## 2021-10-29 NOTE — TELEPHONE ENCOUNTER
Levothyroxine 25 mcg failed protocol due to  Thyroid Supplements Protocol Failed 10/29/2021 09:27 AM   Protocol Details  TSH value between 0.350 and 5.500 IU/ml   Filled 7-30-21  Qty 90  0 refills  No upcoming appt.    LOV 7-29-21  Labs: 7-29-21: TSH + Free

## 2021-11-05 ENCOUNTER — TELEPHONE (OUTPATIENT)
Dept: HEMATOLOGY/ONCOLOGY | Facility: HOSPITAL | Age: 56
End: 2021-11-05

## 2021-12-18 ENCOUNTER — PATIENT MESSAGE (OUTPATIENT)
Dept: INTERNAL MEDICINE CLINIC | Facility: CLINIC | Age: 56
End: 2021-12-18

## 2021-12-18 DIAGNOSIS — Z12.11 SCREENING FOR COLON CANCER: ICD-10-CM

## 2021-12-18 DIAGNOSIS — L98.9 SKIN LESION: Primary | ICD-10-CM

## 2021-12-20 NOTE — TELEPHONE ENCOUNTER
From: Brittaney Light  To: Zohra Quesada MD  Sent: 12/18/2021 1:53 PM CST  Subject: Requests    Hi! Can I please get refill on Liothyronine 25mcg as we are leaving on trip?     Can I have referral to dermatologist Dr. Jg Murphy (recommended by my oncol

## 2021-12-21 RX ORDER — LIOTHYRONINE SODIUM 25 UG/1
TABLET ORAL
Qty: 90 TABLET | Refills: 0 | Status: SHIPPED | OUTPATIENT
Start: 2021-12-21

## 2021-12-21 NOTE — TELEPHONE ENCOUNTER
Pt is requesting Liothyronine 25 mcg, most recent refill was the levothyroxine 25 mcg, which medication should pt be using? Per pt mychart messages should be the Liothyronine.

## 2022-01-31 ENCOUNTER — PATIENT MESSAGE (OUTPATIENT)
Dept: INTERNAL MEDICINE CLINIC | Facility: CLINIC | Age: 57
End: 2022-01-31

## 2022-01-31 RX ORDER — LEVOTHYROXINE SODIUM 0.03 MG/1
TABLET ORAL
Qty: 90 TABLET | Refills: 0 | OUTPATIENT
Start: 2022-01-31

## 2022-01-31 RX ORDER — LIOTHYRONINE SODIUM 25 UG/1
TABLET ORAL
Qty: 30 TABLET | Refills: 0 | OUTPATIENT
Start: 2022-01-31

## 2022-01-31 RX ORDER — LEVOTHYROXINE, LIOTHYRONINE 38; 9 UG/1; UG/1
TABLET ORAL
Qty: 30 TABLET | Refills: 0 | Status: SHIPPED | OUTPATIENT
Start: 2022-01-31

## 2022-01-31 RX ORDER — LEVOTHYROXINE, LIOTHYRONINE 38; 9 UG/1; UG/1
TABLET ORAL
Qty: 90 TABLET | Refills: 0 | OUTPATIENT
Start: 2022-01-31

## 2022-01-31 RX ORDER — LEVOTHYROXINE SODIUM 0.03 MG/1
TABLET ORAL
Qty: 30 TABLET | Refills: 0 | Status: SHIPPED | OUTPATIENT
Start: 2022-01-31

## 2022-01-31 NOTE — TELEPHONE ENCOUNTER
From: Tanesha Daly  Sent: 1/31/2022 1:15 PM CST  To: [facility] Clinical Staff  Subject: Appointment    Ty for DM. I am not going to doctor visits or labs right now d/t Covid, but will when things quite down. Can I have 60 day supply Liothyronine?  I aske

## 2022-01-31 NOTE — TELEPHONE ENCOUNTER
Due for appt - please contact to schedule. Due for lab work.      Failed protocol     Last refill:  LEVOTHYROXINE 25 MCG Oral Tab 90 tablet 0 10/29/2021    Sig:   TAKE 1 TABLET(25 MCG) BY MOUTH BEFORE BREAKFAST       NP THYROID 60 MG Oral Tab 90 tablet 0

## 2022-01-31 NOTE — TELEPHONE ENCOUNTER
liothyronine 25 MCG Oral Tab          Sig: Take 1/2 tablet by mouth daily.     Disp:  30 tablet    Refills:  0    Start: 1/31/2022    Class: Normal    Non-formulary    Last ordered: 1 month ago by Liam Abreu MD     Thyroid Supplements Protocol Failed 01/

## 2022-02-24 NOTE — TELEPHONE ENCOUNTER
Patient calling and last saw Dr Ignatius Lombard  9/17/2021. She is unsure if she was to return in 6 months or 1 year.     Please advise

## 2022-03-02 NOTE — TELEPHONE ENCOUNTER
NP thyroid 60 mg failed protocol due to  Thyroid Supplements Protocol Failed 03/02/2022 10:46 AM   Protocol Details  TSH value between 0.350 and 5.500 IU/ml   Filled 1-31-22  Qty 30  0 refills  No upcoming appt. LOV 2-25-22  Labs: 2-25-22: TSH+ FREE T4    Levothyroxine 25 mcg failed protocol due to  Thyroid Supplements Protocol Failed 03/02/2022 10:46 AM   Protocol Details  TSH value between 0.350 and 5.500 IU/ml   Filled 1-31-22  Qty 30  0 refills  No upcoming appt.    LOV 2-25-22  Labs: 2-25-22: TSH+ FREE T4

## 2022-03-11 NOTE — PROGRESS NOTES
Patient is here for follow up for breast cancer on anastrozole. She has recently had more hot flashes. She also has thyroid concerns and has been more tired recently. She is following up for testing for this. She is eating well. She denies fever or night sweats. She continues to get redness at the radiation site when she is hot but no rash.      Education Record    Learner:  Patient and Spouse    Disease / Diagnosis: Breast cancer    Barriers / Limitations:  None   Comments:    Method:  Discussion   Comments:    General Topics:  Side effects and symptom management   Comments:    Outcome:  Shows understanding   Comments:

## 2022-04-01 NOTE — TELEPHONE ENCOUNTER
From: Juanjose Castellanos  To: Patricia Manning MD  Sent: 3/31/2022 6:33 PM CDT  Subject: Liothyronine 25mcg    This rx not on my comprehensive rx listing. Can I have a 90 day supply refill please as I am out?

## 2022-04-04 NOTE — TELEPHONE ENCOUNTER
Should pt be using all 3 thyroid medications:  Levothyroxine 25 mcg, NP thyroid 60 mg, and Liothyonine 25 mcg (1/2 tab. Daily)?

## 2022-04-04 NOTE — TELEPHONE ENCOUNTER
From: Art Mckeon  Sent: 4/4/2022 9:30 AM CDT  To: [facility] Clinical Staff  Subject: Liothyronine 25mcg    No m'am.

## 2022-04-04 NOTE — TELEPHONE ENCOUNTER
Spoke to [place],   [place] it is to early for script to be released, that she had a script that was picked up on 3/2022 for qty of 90.    Sent [employer] message

## 2022-05-19 NOTE — TELEPHONE ENCOUNTER
From: Pa Hernandez  To: Dre Sifuentes MD  Sent: 5/19/2022 12:33 PM CDT  Subject: Rrferrals    Hi! I have f/u ov with ontological surgeon Dr. Faye Moore 06/07/2022 and need referral.  Can I also have referral back to GI Dr. Srinivas Guadarrama because I keep getting these GI attacks and need resolution.   Ty!

## 2022-05-24 NOTE — TELEPHONE ENCOUNTER
Called pt and notified her that last referral to Derm was submitted 12/21/21, it is authorized and has 3 visits remaining. Pt verbalized understanding.

## 2022-05-24 NOTE — TELEPHONE ENCOUNTER
Pt requesting referral:    Florentino Kaplan MD      Dermatologist  [address], [address] [facility] [place]   Phone: [phone]    She is seeing derm for annual skin check

## 2022-05-24 NOTE — TELEPHONE ENCOUNTER
Pt scheduled VV for extreme fatigue, bruising, dry skin, and brain fog. Pt states she does not want to come in the office due to her completing radiation and covid/flu outbreak. Leigh Armenta for pt to do VV ?     Future Appointments   Date Time Provider Radha Mayuri   6/2/2022  2:30 PM Rochell Cushing, MD [facility] [place]

## 2022-05-25 NOTE — TELEPHONE ENCOUNTER
[phone] and spoke to pt - she has been on her oncology tx for one year now, is at therapeutic level. Pt said she had more energy going through her treatments than she does now. Pt normally does not have bruising and extreme fatigue unless it has something to do with her thyroid, even when her lab levels are \"WNL\".      Pt will let Dr. Aviva Gutiérrez know, but will also keep V V on 6/2/22 with TO.

## 2022-06-25 NOTE — TELEPHONE ENCOUNTER
Liothyronine 25 mcg  Thyroid Supplements Protocol Failed 06/24/2022 02:47 PM   Protocol Details  TSH value between 0.350 and 5.500 IU/ml   Filled 4-4-22  Qty 45  0 refills  No upcoming appt. Telemed.  6-2-22  Labs: 6-12-22: TSH + FREE T4

## 2022-09-19 NOTE — TELEPHONE ENCOUNTER
From: Lisa Harris  To: Mya Franks MD  Sent: 9/16/2022 5:13 PM CDT  Subject: Refill    I requested refill Thyroid NP #90 but only received #30.  Need #90 day refills d/t cost.

## 2022-09-22 NOTE — TELEPHONE ENCOUNTER
Per Lab from 6/12/22: TSH + FREE T4, pt was to follow up with Endo since levels are remaining the same. Will you refill meds. ?

## 2022-09-28 NOTE — TELEPHONE ENCOUNTER
1 month supply pending if appropriate     Levothyronine 25 mcg  Thyroid Supplements Protocol Failed 09/27/2022 09:25 PM   Protocol Details  TSH value between 0.350 and 5.500 IU/ml   Filled 6-26-22  Qty 45  0 refills  LOV 2-25-22 TO  No upcoming appt.   Labs: 6-1-22

## 2022-09-28 NOTE — TELEPHONE ENCOUNTER
Pt is requesting refill of levothyroxine and thyroid NP. Pt states she is confused as to why she needs to see endo since Dr Hugo Delgado has always filled her medication and that her thyroid is suppressed due to history of thyroid cancer. Pt stated that it is going to be hard to get in with endo and would like a fill to tie her over. I inquired if she had a dr in mind, per pt she does not but knew Dr Hugo Delgado referred her to Dr. Wyatt Brown. Pt currently does not have an appt scheduled with endo. Pt stated she only has 2 days left of medication.      Confirmed pharmacy -     [place] [id number] [address] [facility], [address] [place] [address], AT [facility] [address], [phone], [phone]

## 2022-09-28 NOTE — TELEPHONE ENCOUNTER
Duplicate request. [facility] already sent to pt explaining why 90 day cannot be filled.

## 2022-09-28 NOTE — TELEPHONE ENCOUNTER
Duplicate request.   Dr. Jeronimo Garcia only approved 1 month supply. Pt was told to f/u with endo due to lab levels remaining the same. [facility] sent to pt.

## 2022-10-17 NOTE — PROGRESS NOTES
Patient is here for follow up for breast cancer on anastrozole. She is feeling well. She denies joint pains. She has some hot flashes. Her appetite and energy are good. She denies fever, cough or shortness of breath.       Education Record    Learner:  Patient    Disease / Diagnosis: breast cancer  Barriers / Limitations:  None   Comments:    Method:  Discussion   Comments:    General Topics:  Side effects and symptom management   Comments:    Outcome:  Shows understanding   Comments:

## 2022-10-27 NOTE — TELEPHONE ENCOUNTER
Levothyronine 25 mcg  Thyroid Supplements Protocol Failed 10/27/2022 11:10 AM   Protocol Details  TSH value between 0.350 and 5.500 IU/ml   Filled 9-28-22  Qty 15  0 refills  No upcoming appt.   LOV 2-25-22 TO  Labs: 6-12-22 TSH + FREE T4

## 2022-11-17 NOTE — TELEPHONE ENCOUNTER
[facility] sent to pt stating pt was informed to f/u with Endo back in June and has not done so. 45 tab. Sent in for mean time but no further refills, pt will need to be seen by Endo for them to take over refills.    Labs remain elevated overall and need to be seen by specialist.

## 2022-12-11 NOTE — ED INITIAL ASSESSMENT (HPI)
Pt c/o pain to the R upper back/scapular area since Tues radiating to the neck and shoulder. Pt denies injury, she denies CP.  She denies fever, no cough

## 2022-12-11 NOTE — DISCHARGE INSTRUCTIONS
Please follow-up with your primary care physician 1-2 days return to the ER if your symptoms worsen progress or if you have any further concerns. Take Norco as prescribed as needed for severe pain. Otherwise take Motrin 600 mg every 6 hours as needed for pain control. Please take the prednisone daily take it with food. Follow-up with your primary care physician and discuss physical therapy as well as pain control.

## 2022-12-20 NOTE — TELEPHONE ENCOUNTER
From: Warren Parkinson  To: Liam Abreu MD  Sent: 12/19/2022 3:08 PM CST  Subject: ReferrL    Can I please have referral to Dr. Renata Joyner (attached)? Referral to Dr. Wei Morris in June was declined, but I would rather see Dr. Anton Ward anyway as she followed me previously.  Ty!

## 2022-12-22 NOTE — TELEPHONE ENCOUNTER
From: Rah Theodore  Sent: 12/21/2022 1:51 PM CST  To: [facility] Clinical Staff  Subject: ReferrL    Info. for Dr. Jessica Cruz. [facility] told me they need reason why need to see this particular ENT.

## 2022-12-22 NOTE — TELEPHONE ENCOUNTER
Dr. Tamiko Pereira   Phone # [phone]  Fax # [phone]    Referral still in Open status, sent Fred Avilez in referral dept message from pt about I[facility]. 0

## 2022-12-28 NOTE — TELEPHONE ENCOUNTER
From: Omega Garcia  Sent: 12/28/2022 2:08 PM CST  To: [facility] Clinical Staff  Subject: Katie Phlegm. Affirmative. I am on 's plan. He retired November but they didn't get us set up without brief lapse. Was reinstated last month.

## 2022-12-29 NOTE — PROGRESS NOTES
NURSING ADMISSION NOTE      Patient admitted via Cart  Oriented to room. Safety precautions initiated. Bed in low position. Call light in reach. PTA med rec completed with patient. Alert and oriented x4. afib on tele. Rates controlled currently on cardizem gtt, heparin gtt per ACS/Afib protocol. See MAR. Needs met at this time. ~0700: patient back to NSR, confirmed with EKG on chart. Plan: IVF at 100cc/hr, heparin gtt per Afib protocol, cardizem gtt. Cards to see.

## 2022-12-29 NOTE — ED QUICK NOTES
Orders for admission, patient is aware of plan and ready to go upstairs. Any questions, please call ED DAMEON owens  at extension [phone]. Vaccinated?  yes  Type of COVID test sent: rapid  COVID Suspicion level: Low    Titratable drug(s) infusing: cardizem- 15mg/hr  Heparin- 8ml/hr     LOC at time of transport: aa&ox4    Other pertinent information:n/a    CIWA score=  NIH score=

## 2022-12-29 NOTE — ED INITIAL ASSESSMENT (HPI)
Pt to ed with palpitations that woke her up from sleep just pta, denies chest pain or shortness of breath

## 2022-12-29 NOTE — PLAN OF CARE
Patient alert and oriented x 4,  at bedside. Up with standby assist. On RA. NSR on tele, converted at 0714. HR in 60s, cardizem gtt stopped per parameters. Continent of bowel and bladder. No complaints of pain, shortness of breath, or chest pain/discomfort. POC: ECHO. Fall precautions in place. Call light within reach. Problem: Patient/Family Goals  Goal: Patient/Family Long Term Goal  Description: Patient's Long Term Goal: to go home    Interventions:  - consults to see  - See additional Care Plan goals for specific interventions  Outcome: Progressing  Goal: Patient/Family Short Term Goal  Description: Patient's Short Term Goal: understand A fib    Interventions:   - cardiology to see  - ECHO  - See additional Care Plan goals for specific interventions  Outcome: Progressing     Problem: CARDIOVASCULAR - ADULT  Goal: Maintains optimal cardiac output and hemodynamic stability  Description: INTERVENTIONS:  - Monitor vital signs, rhythm, and trends  - Monitor for bleeding, hypotension and signs of decreased cardiac output  - Evaluate effectiveness of vasoactive medications to optimize hemodynamic stability  - Monitor arterial and/or venous puncture sites for bleeding and/or hematoma  - Assess quality of pulses, skin color and temperature  - Assess for signs of decreased coronary artery perfusion - ex.  Angina  - Evaluate fluid balance, assess for edema, trend weights  Outcome: Progressing  Goal: Absence of cardiac arrhythmias or at baseline  Description: INTERVENTIONS:  - Continuous cardiac monitoring, monitor vital signs, obtain 12 lead EKG if indicated  - Evaluate effectiveness of antiarrhythmic and heart rate control medications as ordered  - Initiate emergency measures for life threatening arrhythmias  - Monitor electrolytes and administer replacement therapy as ordered  Outcome: Progressing

## 2023-01-09 NOTE — TELEPHONE ENCOUNTER
Patient following up on status of referral. States she called [facility] and Referral department and both told her to contact her PCP. Sent message to [place] with referrals waiting on reply.

## 2023-01-11 NOTE — TELEPHONE ENCOUNTER
Sent message to [facility] via [employer] with date of pt's OV. Will wait for reply.

## 2023-01-13 NOTE — TELEPHONE ENCOUNTER
From: Joselyn Quesada  Sent: 1/11/2023 4:00 PM CST  To: [facility] Clinical Staff  Subject: Anel Maya so much!

## 2023-01-16 NOTE — TELEPHONE ENCOUNTER
Sent in[employer]quetet message to [place] and [facility] centralized P RFL pool asking for status update as pt has appointment on 1/24 with Dr. Reyna Rodriguez. Will wait for reply.

## 2023-01-19 NOTE — TELEPHONE ENCOUNTER
Spoke with Jackelyn Carreon in referral dept. She stated referral is pending. She did change status to URGENT and placed a note stating that pt has appt on 1/24. She thinks it will be authorized by appt. Continue to check status.

## 2023-02-03 NOTE — TELEPHONE ENCOUNTER
From: Natasha Vazquez  To: Jan Cintron MD  Sent: 12/19/2022 3:08 PM CST  Subject: ReferrL    Can I please have referral to Dr. Reyna Rodriguez (attached)? Referral to Dr. Sarah Smith in June was declined, but I would rather see Dr. Sharri Ramsay anyway as she followed me previously.  Ty!

## 2023-02-10 NOTE — TELEPHONE ENCOUNTER
Referral cancelled. Pt given contact info to in-network ENT's. See referral #[id number]. Spencer Norton states that she has seen in network ENT's in the past and either they are not able to handle her case or they refer her to other ENT's for care. Per records, pt has seen Dr Jack Hoyos who referred her to Dr Cabrera Camargo, but referral to Dr Cabrera Camargo was cancelled d/t being OON. She did report that she seen Dr Ranjith Rdz but paid for visit OOP. I reached out to Dr Kimberly Gerardo office, spoke with his MA Rema and provided her with info regarding pts case. Rema requested for us to fax MRI result so Dr Liz Krause can determine if he will be able to properly treat pt. Pt informed that Dr Kimberly Gerardo office is requesting MRI results. Pt provided verbal consent for us to fax MRI results. Results faxed to [phone]. Rema returned call and stated that Dr Liz Krause reviewed MRI and does not feel that he will be able to assist or any ENT's outside of [facility]. I spoke with Fannie Castellanos from our referral department and informed her of above info. She instructed me to placed new referral and OV notes from Dr Jack Hoyos. Referral entered and OV notes faxed to [phone].

## 2023-02-26 NOTE — TELEPHONE ENCOUNTER
Name from pharmacy: THYROID NP 1GR (60MG) TABLETS          Will file in chart as: NP THYROID 60 MG Oral Tab    Sig: TAKE 1 TABLET(60 MG) BY MOUTH DAILY    Disp:  90 tablet    Refills:  0 (Pharmacy requested: Not specified)    Start: 1/31/2022    Class: Nor Yes

## 2023-04-11 NOTE — TELEPHONE ENCOUNTER
Per Dr. Dayo Orantes, \"Her labs show suppressed TSH and fT4 on the higher side. This is subclinical hyperTH. Is still on LT4 100 still? If so, can lower to 75mcg and repeat labs. Don't want to drop too much quickly as she is s/p thyroidectomy, so she has no reserve. \"     MyChart sent to patient to confirm current Levothyroxine dose.

## 2023-04-11 NOTE — TELEPHONE ENCOUNTER
From: Taisha Clements  To: Phylicia Fernández MD  Sent: 4/11/2023 11:29 AM CDT  Subject: Labs & not feeling well    Hi Dr. Joey Sanders. Attached are [facility] lab results as it has been one week since drawn. Labs were drawn before I took my thyroid rx. I am still not feeling well at all. Also still having palpitations & my [facility] EKG has noted PVC'S 1x & NSR w/wide QRS 2x. I am also super fatigued, shaky, brain fog & lots of hair coming out. I have crystal we can get me feeling better & I am asking for your help & expertise please. Thanks!

## 2023-04-11 NOTE — TELEPHONE ENCOUNTER
Patient replied back confirming current Levothyroxine dose of 100mcg/day. [email or web address] message sent to patient reviewing result notes. Asking which pharmacy she'd like new rx to go to and lab orders.

## 2023-04-18 NOTE — TELEPHONE ENCOUNTER
4/18/23-Received via mail from [employer] for TSH, T4, Free and T3, Total collected on 4/4/23. Given to MD to review.

## 2023-04-20 NOTE — PROGRESS NOTES
Reviewed outside [address] [place] [address] labs colleted 4/4, reported 4/11, received fax 4/18  fT4 1.6 (ref 0.8-1. 8)  TT3 109 (ref range )  TSH 0.01 (ref 0.4-4.5)  Tg ab 1  Tg <0.4    Pt on LT4 100mcg, however due to sx, trial lower to 75mcg.     Merlin Shell, MD

## 2023-06-19 NOTE — PROGRESS NOTES
Patient is here for follow up for breast cancer on anastrozole since 6/2021. She is having some hot flashes but these have only started recently. She has had a recent change in her thyroid medication. She is eating well and her energy is good.       Education Record    Learner:  Patient and Spouse    Disease / Diagnosis: breast cancer    Barriers / Limitations:  None   Comments:    Method:  Discussion   Comments:    General Topics:  Side effects and symptom management   Comments:    Outcome:  Shows understanding   Comments:

## 2023-07-03 NOTE — TELEPHONE ENCOUNTER
LOV: 5/23/2023    RTC: Jan 2024    FU: no follow up on file     Last Refill: 4/11/2023    Month Supply Pending: 3 month     RN phoned patient per Dr. Anay Aleman notes \"continue LT4 75mcg, due for repeat labs now\" LVM for patient to call back office if labs have been done elsewhere. Last lab result 12/28/2022  Component      Latest Ref Rng 12/29/2022   TSH      0.358 - 3.740 mIU/mL <0.005 (L)    T4,Free (Direct)      0.8 - 1.7 ng/dL 1.1         Pended refill and routed to Dr. Charlie Florentino for review.

## 2023-07-10 NOTE — TELEPHONE ENCOUNTER
Patient is requesting a call back. Pt stated that she made a an appointment with Dr Kay Monae and would like to have referral switched to this doctor. Pt stated that Dr Bria Berry will not be able to help with needs to be checked out. Pt stated that she has an appointment this Thursday and can not afford to reschedule appointment. Please advise.

## 2023-07-12 NOTE — TELEPHONE ENCOUNTER
Pt read mcm:     Last read by Mitzy Sky at 12:52 PM on 7/12/2023.      Referral placed by Gustabo Mosley on 7/12, still Pending

## 2023-07-27 NOTE — TELEPHONE ENCOUNTER
Imaging was completed for this patient for a RT Shoulder/Scapula Pain but it needs to be reviewed to see if additional imaging is needed. If so, please enter the appropriate RX and let the patient know to come in before their appointment to complete the additional imaging. Thank you!     Future Appointments   Date Time Provider Radha Messina   8/23/2023  9:20 AM Sg Sanchez MD [facility] [id number]

## 2023-08-08 NOTE — TELEPHONE ENCOUNTER
Per recent result notes, patient scheduled follow up video visit for 10/31/23. She will do repeat thyroid labs prior to that visit. Lab orders pended and routed to Dr. Pearl Riddle for sign off.

## 2023-08-09 NOTE — TELEPHONE ENCOUNTER
LMTCB #1 spouse (OK per verbal).
Mcm sent to pt also.
Patient spouse [facility] called in requesting recommendations from Dr Landon Hutchinson for a different ortho that is able to see her sooner then 8/17 since pt is in a lot of pain. Jj stated that pt does not want to go back to the hospital since they do not help much. Jj would like a call back. Please advise.
No

## 2023-08-15 NOTE — TELEPHONE ENCOUNTER
Calling patient to discuss timing of breast imaging. OK for patient to have mammogram now and breast MRI in December/January. Pt verbalized understanding.

## 2023-10-05 NOTE — TELEPHONE ENCOUNTER
LOV:     RTC: 2024    FU: 10/31    Last Refill: 7    Month Supply Pendin days    Last lab note: TSH recovering, TFTs look good, no change in dose   LOV note: - annual Tg, Tg ab, TFTs  - thyroid US surveillance as needed  - continue LT4 75mcg    Component      Latest Ref Rng 10/5/2023   T3 TOTAL      60 - 181 ng/dL 98    T4,Free (Direct)      0.8 - 1.7 ng/dL 1.4    TSH      0.358 - 3.740 mIU/mL 0.190 (L)        RX pended and routed for review.

## 2023-10-09 NOTE — TELEPHONE ENCOUNTER
From: Amelia Campos  To: Liliamelizabeth Vincent  Sent: 10/9/2023 3:07 PM CDT  Subject: GI Issue    Hi Dr. Geoffrey Elias. Tried the Metamucil, but still having pain under left rib area & some intermittent loose stools. Can you do referral to GI Dr. Lauren Irizarry? You referred Jj to Dr Lauren Irizarry & he is awesome! Thank you!

## 2023-11-22 NOTE — TELEPHONE ENCOUNTER
From: Gagandeep Light  To:  Milka Stoner MD  Sent: 10/20/2020 5:44 PM CDT  Subject: Visit Follow-up Question    Diagnostic Reports Refilled 11/19

## 2023-11-28 NOTE — TELEPHONE ENCOUNTER
Requested Prescriptions     Pending Prescriptions Disp Refills    clobetasol 0.05 % External Cream  0     Sig: Apply 1 Application  topically 2 (two) times daily.  APPLY TO AFFECTED AREA     No protocol     LOV 10/13/23  Refill was external from 2022   Future Appointments   Date Time Provider Radha Messina   11/29/2023  2:15 PM Gennaro Scheuermann, MD [facility] [address]   12/4/2023  4:45 PM [facility] 1 YAEL T[place]   12/5/2023 11:20 AM Luis Daniel Nunez MD [facility]

## 2023-11-29 NOTE — TELEPHONE ENCOUNTER
Pt is calling to see if she can be seen for a f/u. Pt will be out of state until April and would like to see him before she leaves on 12/06/23.  Please call pt-NL

## 2023-12-04 NOTE — PROGRESS NOTES
Patient is here for follow up for breast cancer on anastrozole. She is tolerating this well. She denies any hot flashes or joint pains. Her appetite and energy are good. She denies any fever, cough or shortness of breath.       Education Record    Learner:  Patient and Spouse    Disease / Diagnosis: Breast cancer    Barriers / Limitations:  None   Comments:    Method:  Discussion   Comments:    General Topics:  Side effects and symptom management   Comments:    Outcome:  Shows understanding   Comments:

## 2023-12-18 NOTE — TELEPHONE ENCOUNTER
Patient called to request referral for     Dr. Jhonatan Oden, [address] [place] [address] for testing follow ups.

## 2023-12-19 NOTE — TELEPHONE ENCOUNTER
LOV: 9/1/23     RTC: Jan 2024    FU: no f/u appointment scheduled     Phoned patient to discuss if she will be following up with our office- per result notes from thyroid labs 10/5/23- \"TSH recovering, TFTs look good, no change in dose\"    Patient notes that she is leaving to go out of state this Thursday 12/21 and will not be back until possibly April or May of 2024.        Component      Latest Ref Rng 10/5/2023   T4,Free (Direct)      0.8 - 1.7 ng/dL 1.4    TSH      0.358 - 3.740 mIU/mL 0.190 (L)       Legend:  (L) Low

## 2023-12-19 NOTE — TELEPHONE ENCOUNTER
Please have patient repeat thyroid labs before she leaves if able -  I pended. Ordered LT4 75mcg daily with no refills now, but if gets labs done will put in refill - otherwise could we check in in 3 mo if she's back in [place]?  Who will she be following with?    - goal TSH 0.5-2.0

## 2023-12-20 NOTE — TELEPHONE ENCOUNTER
Pt is calling to ask about the refill request on  Anastrozole. She will be leaving out of town tomorrow morning and would like to have it refilled today. She will be gone for 4 months-NL

## 2023-12-20 NOTE — TELEPHONE ENCOUNTER
Called patient and explained that the Rx sent in June was for 90 days with 3 refills.  She should have refills available.  She had trouble getting through to [email or web address] so she called here.  I asked her to call back if there was a problem.

## 2023-12-20 NOTE — TELEPHONE ENCOUNTER
Patient phone in saying she may be able to get labs done before trip. Confirmed with patient that labs are ordered in system and can be done at any [facility]. Patient verbalized understanding.

## 2023-12-20 NOTE — TELEPHONE ENCOUNTER
RN phoned patient per note from Marilu RAMIREZ,    Patient states that she will most likely not be able to have labs done before her trip. She has had labs done out of state before and the lab has sent them to our office. Patient is planning on following up with Dr. Katiuska Joel when she returns to [place]. Lab orders printed in case they are able to stop by today to pick them up, otherwise patient to reach out when she knows where to have lab orders sent out of state. Patient aware she will need repeat labwork before getting her next refill. Routed to Marilu RAMIREZ as DANIELA.

## 2023-12-22 NOTE — TELEPHONE ENCOUNTER
She completed repeat labs which looked good, so refill sent to cover her for next six months or so. Will close encounter.

## 2024-03-06 NOTE — TELEPHONE ENCOUNTER
From: Shannon Kennedy  To: Lois Allen  Sent: 3/5/2024 12:10 PM CST  Subject: Further Research     Hi Dr. Allen!  Just wondering if you were able to complete the research you brought up with myself & my hisband regarding my case since I saw you 11/29/2023. I also have some concerns about possible s/p BSO risks I would be super appreciative to discuss with you.

## 2024-03-12 NOTE — TELEPHONE ENCOUNTER
[place] from [facility] requesting demographic sheet to be faxed to [phone]. .[phone]

## 2024-04-01 NOTE — TELEPHONE ENCOUNTER
LOV: 09/01/2023     Next office visit: no apt schedule      Last filled: 12/22/2023    RTC: Jan 2024     Order pended and routed to provider

## 2024-04-25 NOTE — TELEPHONE ENCOUNTER
Returned patient's call and left voicemail inquiring if patient attempted to call and self schedule MRI. Awaiting call back.

## 2024-04-25 NOTE — TELEPHONE ENCOUNTER
Patient called to see if Dr Aguilar could get her in for an MRI within the next 2 weeks. Please call back.  Thank you.

## (undated) DEVICE — CONMED SCOPE SAVER BITE BLOCK, 20X27 MM: Brand: SCOPE SAVER

## (undated) DEVICE — PROVE COVER: Brand: UNBRANDED

## (undated) DEVICE — SYRINGE 5ML LL TIP

## (undated) DEVICE — SUTURE MONOCRYL 4-0 PS-2

## (undated) DEVICE — CANNULA NASAL 02/C02 ADULT

## (undated) DEVICE — UNDERPAD 23X36 LIGHT ASBORB

## (undated) DEVICE — CAUTERY BLADE 2IN INS E1455

## (undated) DEVICE — REVEAL DISTAL ATTACHMENT CAP

## (undated) DEVICE — HEMOCLIP HORIZON MED 002200

## (undated) DEVICE — DRAPE,TAPE STRIPS,STERILE: Brand: MEDLINE

## (undated) DEVICE — Device: Brand: CUSTOM PROCEDURE KIT

## (undated) DEVICE — #15 STERILE STAINLESS BLADE: Brand: STERILE STAINLESS BLADES

## (undated) DEVICE — SUTURE VICRYL 3-0 SH

## (undated) DEVICE — LIGHT HANDLE

## (undated) DEVICE — SUTURE SILK 2-0 FS

## (undated) DEVICE — SOL  .9 1000ML BTL

## (undated) DEVICE — KENDALL SCD EXPRESS SLEEVES, KNEE LENGTH, MEDIUM: Brand: KENDALL SCD

## (undated) DEVICE — LINE MNTR ADLT SET O2 INTMD

## (undated) DEVICE — 3M™ STERI-DRAPE™ INSTRUMENT POUCH 1018: Brand: STERI-DRAPE™

## (undated) DEVICE — BREAST-HERNIA-PORT CDS-LF: Brand: MEDLINE INDUSTRIES, INC.

## (undated) DEVICE — STANDARD HYPODERMIC NEEDLE,POLYPROPYLENE HUB: Brand: MONOJECT

## (undated) DEVICE — HEMOCLIP HORIZON SM 001200

## (undated) DEVICE — Device: Brand: DEFENDO AIR/WATER/SUCTION AND BIOPSY VALVE

## (undated) DEVICE — MEDI-VAC NON-CONDUCTIVE SUCTION TUBING 6MM X 1.8M (6FT.) L: Brand: CARDINAL HEALTH

## (undated) DEVICE — TOWEL OR BLU 16X26 STRL

## (undated) DEVICE — CLEAR MONOFILAMENT (POLYDIOXANONE), ABSORBABLE SURGICAL SUTURE: Brand: PDS

## (undated) DEVICE — STERILE POLYISOPRENE POWDER-FREE SURGICAL GLOVES: Brand: PROTEXIS

## (undated) DEVICE — FORCEP RADIAL JAW 4

## (undated) DEVICE — Device

## (undated) DEVICE — DRAPE PACK CHEST & U BAR

## (undated) NOTE — LETTER
[address] [place] [address]  Authorization for Invasive Procedures  1.  I hereby authorize Dr. Brittani Orosco , my physician and whomever may be designated as the doctor's assistant, to perform the following operation and/or procedure:  C blood producst. The following are some, but not all, of the potential risks that can occur: fever and allergic reactions, hemolytic reactions, transmission of disease such as hepatitis, AIDS, cytomegalovirus (CMV), and flluid overload.  In the event that I sedation/analgesia as may be necessary or desirable in the judgment of my physician.      Signature of Patient:  ________________________________________________ Date: _________Time: _________    Responsible person in case of minor or unconscious: _________

## (undated) NOTE — ED AVS SNAPSHOT
[facility] Emergency Department    [place]  [address] [address]    Phone:  [phone]    Fax:  [address] [place] [address]   MRN: [id number]    Department:  [facility] Emergency Department   Date of Visit:  5/1 Quantity:  20 tablet   Commonly known as:  AUGMENTIN   Take 1 tablet by mouth 2 (two) times daily.        ondansetron 4 MG Tbdp   Quantity:  10 tablet   Commonly known as:  ZOFRAN-ODT   Take 1 tablet (4 mg total) by mouth every 4 (four) hours as needed for receive this, we would really appreciate it if you could take the time to complete it. Thank you! You were examined and treated today on an urgent basis only. This was not a substitute for ongoing medical care.  Often, one Emergency Department visit d 4455  [facility] ([address]) [facility] [address] ([address]) [address]  [place] ([address]) 21  [address] ([address]) 512 Dictated by: Zeferino Ferrara MD on 5/13/2017 at 13:37       Approved by: Zeferino Ferrara MD              Narrative:    PROCEDURE:  CT OF THE SOFT TISSUE NECK WITH CONTRAST     COMPARISON:  None.      INDICATIONS:  sore throat     TECHNIQUE:  IV contrast Enter your "[employer] Activation Code exactly as it appears below along with your Zip Code and Date of Birth to complete the sign-up process. If you do not sign up before the expiration date, you must request a new code. Your unique "[employer] Access Code:  [email or web address]

## (undated) NOTE — LETTER
Printed: 2021    Patient Name: Kerry Sanchez  : 3/26/1965   Medical Record #: [id number]    Consent to Cancer Treatment    I, Kerry Sanchez, understand that I have been diagnosed with Breast Cancer (stage [id number] ).     I understand that at any time if I have questions, by calling [phone]. Additional written information will be given to me prior to start of therapy. Additionally, I will receive a copy of this consent form.     I have read and fully understand this consent to canjeff

## (undated) NOTE — ED AVS SNAPSHOT
[facility] Emergency Department    [place]  [address] [address]    Phone:  [phone]    Fax:  [address] [facility]   MRN: [id number]    Department:  [facility] Emergency Department   Date of Visit:  5/1 IF THERE IS ANY CHANGE OR WORSENING OF YOUR CONDITION, CALL YOUR PRIMARY CARE PHYSICIAN AT ONCE OR RETURN IMMEDIATELY TO THE EMERGENCY DEPARTMENT.     If you have been prescribed any medication(s), please fill your prescription right away and begin taking t

## (undated) NOTE — LETTER
05/21/21        Aliyah Moreno  [address] [facility] [address]      Dear Yovanny Mcneal,    [address] [place] [address] records indicate that you have outstanding lab work and or testing that was ordered for you and has not yet been completed:  Orders Placed This Encounter

## (undated) NOTE — LETTER
Moy Gonsalves M.D., F.A.C.S.     Surgical Clearance Needed    Date: 3/18/2021                                                                       From: Alton Carter RN    Attn: DR Dilcia Lu

## (undated) NOTE — ED AVS SNAPSHOT
[facility] in [address] [place] [address] Drive,[address] Floor    [address] [place] [address]    Phone:  [phone]    Fax:  [phone]           Christina Wade   MRN: [id number]    Department:  [facility] in [facility] END   Date of Visit:  5/13/2017 Expect to receive an electronic request (by e-mail or text) to complete a self-assessment the day after your visit. You may also receive a call from our patient liason soon after your visit.  Also, some patients receive a detailed feedback survey mailed to [facility] [address]  ([address]) [address] [facility] [phone] [address] [facility] [address] ([address] [place] [address]) [phone] [address] [address] [address] ( Your unique [employer] Access Code: [email or web address]  Expires: 7/12/2017 10:38 AM    If you have questions, you can call [phone] to talk to our [facility] Staff. Remember, [employer] is NOT to be used for urgent needs. For medical emergencies, dial 911.

## (undated) NOTE — Clinical Note
[address],  This patient saw you earlier this month with a rash. I saw her today because the rash has increased. She now has a area in the superior left chest. She has been using the steroid cream without any benefit.  She finished radiation in 6/2021 and th

## (undated) NOTE — LETTER
[facility]  Administration of Anesthesia  1. Nabeel Benjamin, or _________________________________ acting on her behalf, (Patient) (Dependent/Representative) request to receive anesthesia for my pending procedure/operation/treatment. bleeding, seizure, cardiac arrest and death. 7. AWARENESS: I understand that it is possible (but unlikely) to have explicit memory of events from the operating room while under general anesthesia.   8. ELECTROCONVULSIVE THERAPY PATIENTS: This consent serve below affirms that prior to the time of the procedure, I have explained to the patient and/or his/her guardian, the risks and benefits of undergoing anesthesia, as well as any reasonable alternatives.     ___________________________________________________